# Patient Record
Sex: FEMALE | Race: WHITE
[De-identification: names, ages, dates, MRNs, and addresses within clinical notes are randomized per-mention and may not be internally consistent; named-entity substitution may affect disease eponyms.]

---

## 2017-02-19 NOTE — RADHPO
EXAM DATE/TIME:  02/19/2017 17:49 

 

[facility] COMPARISON:     

No previous studies available for comparison.

 

 

INDICATIONS :     

Epigastric abdomen pain today.

                  

 

ORAL CONTRAST:      

No oral contrast ingested.

                  

 

RADIATION DOSE:     

10.49 CTDIvol (mGy) 

 

 

MEDICAL HISTORY :     

None  

 

SURGICAL HISTORY :      

Gastric bypass. Hysterectomy.gastric bypass reversal

 

ENCOUNTER:      

Initial

 

ACUITY:      

1 day

 

PAIN SCALE:      

7/10

 

LOCATION:         

epigastric abdomen

 

TECHNIQUE:     

Volumetric scanning of the abdomen and pelvis was performed.  Using automated exposure control and ad
justment of the mA and/or kV according to patient size, radiation dose was kept as low as reasonably 
achievable to obtain optimal diagnostic quality images. 

 

FINDINGS:     

There is linear scarring left lung base.

 

No acute findings in the liver, spleen, adrenals, kidneys or pancreas. No calcified gallstones. There
 are postoperative changes of gastric bypass surgery, ventral hernia repair and hysterectomy.

 

There is no bowel obstruction. No free air or free fluid. Mild constipation.

 

CONCLUSION:     

1. Mild constipation. No acute findings. Postoperative changes of gastric bypass surgery, ventral her
yordan repair and hysterectomy.

 

 

 

 Jose Valle MD on February 19, 2017 at 18:44           

Board Certified Radiologist.

 This report was verified electronically.

## 2017-02-20 NOTE — EKG
Date Performed: 02/19/2017       Time Performed: 17:37:28

 

PTAGE:      66 years

 

EKG:      Sinus rhythm 

 

 Normal ECG 

 

NO PREVIOUS TRACING            

 

DOCTOR:   Hamilton Metzger  Interpretating Date/Time  02/20/2017 14:27:32

## 2017-07-06 NOTE — PD
HPI


Chief Complaint:  Abdominal Pain


Time Seen by Provider:  16:56


Travel History


International Travel<30 days:  No


Contact w/Intl Traveler<30days:  No


Traveled to known affect area:  No





History of Present Illness


HPI


The patient is a 66-year-old  female who presents to the emergency 

department for epigastric abdominal pain.  The patient has a history of 

previous gastric bypass that was performed in  when she weighed 254 pounds.

  The patient had a reversal done several years later when she weighed less 

than 100 pounds.  The patient has had a history of chronic epigastric abdominal 

pain since the reversal.  The patient traveled from [place] yesterday till [place]

, and helps of relocating after her   2 years ago.  The patient has 

been under a lot of stress recently secondary to her living circumstances.  The 

patient complains of epigastric abdominal pain that radiates diffusely when her 

abdomen is palpated.  She does complain of nausea without any vomiting.  The 

patient had a large bowel movement this normal without any visible blood.  The 

patient does have a history of previous hysterectomy, but denies a history 

pancreatitis or cholecystitis.  The patient does drink alcohol occasionally, 

approximately once per month.  Patient denies any fever, chills, or sweats.  

The patient does have a history of atrial fibrillation for which she takes an 

antiarrhythmic medication.





PFSH


Past Medical History


Heart Rhythm Problems:  Yes (A FIB)


Cardiovascular Problems:  Yes (hx of a-fib)


Diminished Hearing:  Yes


Headaches:  Yes (MIGRAINES)


Ulcer:  Yes


Tetanus Vaccination:  Unknown


Pregnant?:  Not Pregnant





Past Surgical History


Hysterectomy:  Yes


Other Surgery:  Yes (GASTRIC BYPASS AND REVERSAL)





Social History


Alcohol Use:  Yes


Tobacco Use:  Yes (10 CIGS PER DAY)


Substance Use:  No





Allergies-Medications


(Allergen,Severity, Reaction):  


Coded Allergies:  


     Codeine (Verified  Allergy, Intermediate, headache,n/v, 17)


     Morphine (Verified  Allergy, Intermediate, headache,n/v, 17)


     Oxycontin (Verified  Allergy, Intermediate, headaches,n/v, 17)


Reported Meds & Prescriptions





Reported Meds & Active Scripts


Active


Reported


Omeprazole 40 Mg Cap 80 Mg PO DAILY


Clonazepam 1 Mg Tab 1 Mg PO HS


Neurontin (Gabapentin) 300 Mg Cap 900 Mg PO TID


Paroxetine (Paroxetine HCl) 10 Mg Tab 10 Mg PO DAILY


Ditropan (Oxybutynin Chloride) 5 Mg Tab 5 Mg PO DAILY


Levothyroxine (Levothyroxine Sodium) 75 Mcg Tab 75 Mcg PO DAILY


Duloxetine DR (Duloxetine HCl) 60 Mg Capdr 60 Mg PO DAILY


Propafenone ER (Propafenone HCl) 225 Mg Cap 225 Mg PO Q12HR


Topiramate 50 Mg Tab 75 Mg PO HS


Paroxetine (Paroxetine HCl) 40 Mg Tab 40 Mg PO DAILY








Review of Systems


Except as stated in HPI:  all other systems reviewed are Neg


General / Constitutional:  No: Fever, Chills


Cardiovascular:  Positive: Irregular Rhythm (history of atrial fibrillation),  

No: Chest Pain or Discomfort


Respiratory:  No: Shortness of Breath


Gastrointestinal:  Positive: Nausea, Abdominal Pain,  No: Vomiting, Diarrhea, 

Constipation


Genitourinary:  No: Dysuria


Musculoskeletal:  No: Weakness


Neurologic:  No: Dizziness





Physical Exam


Narrative


GENERAL: Awake, alert, pleasant 66-year-old female who appears her stated age 

and is in no acute respiratory distress.


SKIN: Warm and dry.


HEAD: Atraumatic. Normocephalic. 


EYES: No injection or drainage.


ENT: No nasal bleeding or discharge.  Slightly dry mucous membranes.


NECK: Trachea midline. No JVD. 


CARDIOVASCULAR: Regular rate and rhythm.  No murmur appreciated.


RESPIRATORY: No accessory muscle use. Clear to auscultation. Breath sounds 

equal bilaterally. 


GASTROINTESTINAL: Abdomen soft, tender to palpation epigastrium and upper 

quadrant, but no rebound tenderness, guarding, rigidity.


MUSCULOSKELETAL: No obvious deformities. No clubbing.  No cyanosis.  No edema. 


NEUROLOGICAL: Awake and alert. No obvious cranial nerve deficits.  Motor 

grossly within normal limits. Normal speech.


PSYCHIATRIC: Appropriate mood and affect; insight and judgment normal.





Data


Data


Last Documented VS





Vital Signs








  Date Time  Temp Pulse Resp B/P Pulse Ox O2 Delivery O2 Flow Rate FiO2


 


17 18:56      Room Air  


 


17 17:16  82 18 166/87 97   


 


17 15:30 98.3       








Orders





 Urinalysis - C+S If Indicated (17 15:23)


Complete Blood Count With Diff (17 17:24)


Comprehensive Metabolic Panel (17 17:24)


Lipase (17 17:24)


Lactic Acid (17 17:24)


Prothrombin Time / Inr (Pt) (17 17:24)


Act Partial Throm Time (Ptt) (17 17:24)


Ct Abd/Pel W/O Iv Contrast (17 17:24)


Iv Access Insert/Monitor (17 17:24)


Ecg Monitoring (17 17:24)


Oximetry (17 17:24)


Ondansetron Inj (Zofran Inj) (17 17:30)


Sodium Chlor 0.9% 1000 Ml Inj (Ns 1000 M (17 17:24)


Sodium Chloride 0.9% Flush (Ns Flush) (17 17:30)


Electrocardiogram (17 17:24)


Hydromorphone Pf Inj (Dilaudid Pf Inj) (17 17:30)


Sodium Chlorid 0.9% 500 Ml Inj (Ns 500 M (17 19:15)





Labs








 Laboratory Tests








Test 17





 17:30 18:25


 


White Blood Count 14.0 TH/MM3 


 


Red Blood Count 4.87 MIL/MM3 


 


Hemoglobin 14.8 GM/DL 


 


Hematocrit 46.3 % 


 


Mean Corpuscular Volume 95.1 FL 


 


Mean Corpuscular Hemoglobin 30.4 PG 


 


Mean Corpuscular Hemoglobin 32.0 % 





Concent  


 


Red Cell Distribution Width 15.1 % 


 


Platelet Count 435 TH/MM3 


 


Mean Platelet Volume 8.6 FL 


 


Neutrophils (%) (Auto) 88.1 % 


 


Lymphocytes (%) (Auto) 7.5 % 


 


Monocytes (%) (Auto) 3.2 % 


 


Eosinophils (%) (Auto) 0.1 % 


 


Basophils (%) (Auto) 1.1 % 


 


Neutrophils # (Auto) 12.4 TH/MM3 


 


Lymphocytes # (Auto) 1.0 TH/MM3 


 


Monocytes # (Auto) 0.4 TH/MM3 


 


Eosinophils # (Auto) 0.0 TH/MM3 


 


Basophils # (Auto) 0.2 TH/MM3 


 


CBC Comment DIFF FINAL  


 


Differential Comment   


 


Prothrombin Time 10.3 SEC 


 


Prothromb Time International 0.9 RATIO 





Ratio  


 


Activated Partial 20.5 SEC 





Thromboplast Time  


 


Sodium Level 140 MEQ/L 


 


Potassium Level 3.5 MEQ/L 


 


Chloride Level 106 MEQ/L 


 


Carbon Dioxide Level 19.3 MEQ/L 


 


Anion Gap 15 MEQ/L 


 


Blood Urea Nitrogen 15 MG/DL 


 


Creatinine 0.64 MG/DL 


 


Estimat Glomerular Filtration 92 ML/MIN 





Rate  


 


Random Glucose 162 MG/DL 


 


Lactic Acid Level 1.5 mmol/L 


 


Calcium Level 9.3 MG/DL 


 


Total Bilirubin 0.4 MG/DL 


 


Aspartate Amino Transf 10 U/L 





(AST/SGOT)  


 


Alanine Aminotransferase 13 U/L 





(ALT/SGPT)  


 


Alkaline Phosphatase 98 U/L 


 


Total Protein 8.1 GM/DL 


 


Albumin 4.1 GM/DL 


 


Lipase 66 U/L 


 


Urine Color  YELLOW 


 


Urine Turbidity  SLIGHT 


 


Urine pH  7.0 


 


Urine Specific Gravity  1.016 


 


Urine Protein  30 mg/dL


 


Urine Glucose (UA)  NEG mg/dL


 


Urine Ketones  40 mg/dL


 


Urine Occult Blood  TRACE 


 


Urine Nitrite  NEG 


 


Urine Bilirubin  NEG 


 


Urine Leukocyte Esterase  NEG 


 


Urine RBC  0-3 /hpf


 


Urine WBC  0-2 /hpf


 


Urine Squamous Epithelial  0-5 /hpf





Cells  


 


Urine Amorphous Sediment  MOD 


 


Urine Hyaline Casts  3-5 /lpf


 


Urine Mucus  FEW /lpf


 


Microscopic Urinalysis Comment  CULT NOT





  INDICATED














MDM


Medical Decision Making


Medical Screen Exam Complete:  Yes


Emergency Medical Condition:  Yes


Medical Record Reviewed:  Yes


Interpretation(s)


EKG reveals normal sinus rhythm with a rate of 77.  No ischemic changes noted.








Last Impressions








Abdomen/Pelvis CT 17 1724 Signed





Impressions: 





 Service Date/Time:  2017 17:49 - CONCLUSION:  1. Mild 





 constipation. No acute findings. Postoperative changes of gastric bypass 





 surgery, ventral hernia repair and hysterectomy.     Jose Valle MD 








 Laboratory Tests








Test 17





 17:30 18:25


 


White Blood Count 14.0 TH/MM3 


 


Red Blood Count 4.87 MIL/MM3 


 


Hemoglobin 14.8 GM/DL 


 


Hematocrit 46.3 % 


 


Mean Corpuscular Volume 95.1 FL 


 


Mean Corpuscular Hemoglobin 30.4 PG 


 


Mean Corpuscular Hemoglobin 32.0 % 





Concent  


 


Red Cell Distribution Width 15.1 % 


 


Platelet Count 435 TH/MM3 


 


Mean Platelet Volume 8.6 FL 


 


Neutrophils (%) (Auto) 88.1 % 


 


Lymphocytes (%) (Auto) 7.5 % 


 


Monocytes (%) (Auto) 3.2 % 


 


Eosinophils (%) (Auto) 0.1 % 


 


Basophils (%) (Auto) 1.1 % 


 


Neutrophils # (Auto) 12.4 TH/MM3 


 


Lymphocytes # (Auto) 1.0 TH/MM3 


 


Monocytes # (Auto) 0.4 TH/MM3 


 


Eosinophils # (Auto) 0.0 TH/MM3 


 


Basophils # (Auto) 0.2 TH/MM3 


 


CBC Comment DIFF FINAL  


 


Differential Comment   


 


Prothrombin Time 10.3 SEC 


 


Prothromb Time International 0.9 RATIO 





Ratio  


 


Activated Partial 20.5 SEC 





Thromboplast Time  


 


Sodium Level 140 MEQ/L 


 


Potassium Level 3.5 MEQ/L 


 


Chloride Level 106 MEQ/L 


 


Carbon Dioxide Level 19.3 MEQ/L 


 


Anion Gap 15 MEQ/L 


 


Blood Urea Nitrogen 15 MG/DL 


 


Creatinine 0.64 MG/DL 


 


Estimat Glomerular Filtration 92 ML/MIN 





Rate  


 


Random Glucose 162 MG/DL 


 


Lactic Acid Level 1.5 mmol/L 


 


Calcium Level 9.3 MG/DL 


 


Total Bilirubin 0.4 MG/DL 


 


Aspartate Amino Transf 10 U/L 





(AST/SGOT)  


 


Alanine Aminotransferase 13 U/L 





(ALT/SGPT)  


 


Alkaline Phosphatase 98 U/L 


 


Total Protein 8.1 GM/DL 


 


Albumin 4.1 GM/DL 


 


Lipase 66 U/L 


 


Urine Color  YELLOW 


 


Urine Turbidity  SLIGHT 


 


Urine pH  7.0 


 


Urine Specific Gravity  1.016 


 


Urine Protein  30 mg/dL


 


Urine Glucose (UA)  NEG mg/dL


 


Urine Ketones  40 mg/dL


 


Urine Occult Blood  TRACE 


 


Urine Nitrite  NEG 


 


Urine Bilirubin  NEG 


 


Urine Leukocyte Esterase  NEG 


 


Urine RBC  0-3 /hpf


 


Urine WBC  0-2 /hpf


 


Urine Squamous Epithelial  0-5 /hpf





Cells  


 


Urine Amorphous Sediment  MOD 


 


Urine Hyaline Casts  3-5 /lpf


 


Urine Mucus  FEW /lpf


 


Microscopic Urinalysis Comment  CULT NOT





  INDICATED








Differential Diagnosis


Differential diagnosis includes gastritis, peptic ulcer disease, pancreatitis, 

biliary colic, atypical cholecystitis, AAA, inferior myocardial infarction, 

partial small bowel obstruction.


Narrative Course


IV was established, labs are drawn and sent, and the patient was placed on 

cardiac telemetry monitoring and continuous pulse oximetry monitoring.  EKG was 

ordered and interpreted.  The patient was administered Dilaudid, Zofran, and IV 

fluids.  CT of the abdomen and pelvis was ordered.  Patient's white count is 

mildly elevated at 14.0.  BUN/creatinine are normal, however, bicarbonate was 

slightly low and UA reveals ketones, therefore, patient was administered more 

IV fluids.  CT of the abdomen and pelvis reveals postoperative changes but no 

acute findings except for mild constipation.  The patient will be discharged 

home on pain medications and antiemetics.  The patient had a large bowel 

movement earlier today per her report.  She is advised to follow-up with a 

primary physician and return if symptoms worsen or progress.





Diagnosis





 Primary Impression:  


 Abdominal pain


 Qualified Code:  R10.13 - Epigastric pain





***Additional Instructions:


Medications as directed.  Follow-up with a primary physician.  Return if 

symptoms worsen or progress.  Please provide the patient a copy of her CT 

results and lab results at discharge.


***Med/Other Pt SpecificInfo:  Prescription(s) given


Scripts


Ondansetron Odt (Zofran Odt)4 Mg Tab4 Mg SL Q6HR PRN (Nausea/Vomiting) #7 TAB  

Ref 0


   Prov:Tobin Ovalle MD         17 


Hydrocodone-Acetaminophen (Norco)5-325 mg Tab1 Tab PO Q6H PRN (PAIN) #12 TAB  

Ref 0


   Prov:Tobin Ovalle MD         17


Disposition:  01 DISCHARGE HOME


Condition:  Stable








Tobin Ovalle MD 2017 17:36 : Yes

## 2017-08-26 ENCOUNTER — HOSPITAL ENCOUNTER (EMERGENCY)
Dept: HOSPITAL 17 - PHED | Age: 67
Discharge: HOME | End: 2017-08-26
Payer: MEDICARE

## 2017-08-26 VITALS — HEIGHT: 62 IN | WEIGHT: 114.64 LBS | BODY MASS INDEX: 21.1 KG/M2

## 2017-08-26 VITALS
OXYGEN SATURATION: 96 % | RESPIRATION RATE: 18 BRPM | DIASTOLIC BLOOD PRESSURE: 89 MMHG | HEART RATE: 70 BPM | TEMPERATURE: 97.7 F | SYSTOLIC BLOOD PRESSURE: 184 MMHG

## 2017-08-26 DIAGNOSIS — Z98.84: ICD-10-CM

## 2017-08-26 DIAGNOSIS — M79.7: ICD-10-CM

## 2017-08-26 DIAGNOSIS — I48.91: ICD-10-CM

## 2017-08-26 DIAGNOSIS — F17.210: ICD-10-CM

## 2017-08-26 DIAGNOSIS — R94.31: ICD-10-CM

## 2017-08-26 DIAGNOSIS — K27.9: Primary | ICD-10-CM

## 2017-08-26 LAB
ALP SERPL-CCNC: 87 U/L (ref 45–117)
ALT SERPL-CCNC: 16 U/L (ref 10–53)
ANION GAP SERPL CALC-SCNC: 12 MEQ/L (ref 5–15)
AST SERPL-CCNC: 14 U/L (ref 15–37)
BASOPHILS # BLD AUTO: 0.1 TH/MM3 (ref 0–0.2)
BASOPHILS NFR BLD: 1.2 % (ref 0–2)
BILIRUB SERPL-MCNC: 0.4 MG/DL (ref 0.2–1)
BUN SERPL-MCNC: 13 MG/DL (ref 7–18)
CHLORIDE SERPL-SCNC: 99 MEQ/L (ref 98–107)
EOSINOPHIL # BLD: 0 TH/MM3 (ref 0–0.4)
EOSINOPHIL NFR BLD: 0.3 % (ref 0–4)
ERYTHROCYTE [DISTWIDTH] IN BLOOD BY AUTOMATED COUNT: 15.6 % (ref 11.6–17.2)
GFR SERPLBLD BASED ON 1.73 SQ M-ARVRAT: 107 ML/MIN (ref 89–?)
HCO3 BLD-SCNC: 23.6 MEQ/L (ref 21–32)
HCT VFR BLD CALC: 51.6 % (ref 35–46)
HEMO FLAGS: (no result)
LYMPHOCYTES # BLD AUTO: 1 TH/MM3 (ref 1–4.8)
LYMPHOCYTES NFR BLD AUTO: 9.1 % (ref 9–44)
MCH RBC QN AUTO: 30.9 PG (ref 27–34)
MCHC RBC AUTO-ENTMCNC: 31.4 % (ref 32–36)
MCV RBC AUTO: 98.2 FL (ref 80–100)
MONOCYTES NFR BLD: 2.5 % (ref 0–8)
NEUTROPHILS # BLD AUTO: 9.1 TH/MM3 (ref 1.8–7.7)
NEUTROPHILS NFR BLD AUTO: 86.9 % (ref 16–70)
PLATELET # BLD: 486 TH/MM3 (ref 150–450)
POTASSIUM SERPL-SCNC: 4.3 MEQ/L (ref 3.5–5.1)
RBC # BLD AUTO: 5.25 MIL/MM3 (ref 4–5.3)
SODIUM SERPL-SCNC: 135 MEQ/L (ref 136–145)
WBC # BLD AUTO: 10.5 TH/MM3 (ref 4–11)

## 2017-08-26 PROCEDURE — 85025 COMPLETE CBC W/AUTO DIFF WBC: CPT

## 2017-08-26 PROCEDURE — 80053 COMPREHEN METABOLIC PANEL: CPT

## 2017-08-26 PROCEDURE — 93005 ELECTROCARDIOGRAM TRACING: CPT

## 2017-08-26 PROCEDURE — 96361 HYDRATE IV INFUSION ADD-ON: CPT

## 2017-08-26 PROCEDURE — 96374 THER/PROPH/DIAG INJ IV PUSH: CPT

## 2017-08-26 PROCEDURE — 83690 ASSAY OF LIPASE: CPT

## 2017-08-26 PROCEDURE — 99284 EMERGENCY DEPT VISIT MOD MDM: CPT

## 2017-08-26 PROCEDURE — 96375 TX/PRO/DX INJ NEW DRUG ADDON: CPT

## 2017-08-26 NOTE — PD
Data


Data


Last Documented VS





Vital Signs








  Date Time  Temp Pulse Resp B/P (MAP) Pulse Ox O2 Delivery O2 Flow Rate FiO2


 


8/26/17 15:28 97.7 70 18 184/89 (120) 96   








Orders





 Orders


Complete Blood Count With Diff (8/26/17 15:48)


Comprehensive Metabolic Panel (8/26/17 15:48)


Lipase (8/26/17 15:48)


Sodium Chlor 0.9% 1000 Ml Inj (Ns 1000 M (8/26/17 16:00)


Ondansetron Inj (Zofran Inj) (8/26/17 16:00)


Hydromorphone Pf Inj (Dilaudid Pf Inj) (8/26/17 16:00)


Al-Mag Hy-Si 40-40-4 Mg/Ml Liq (Mag-Al P (8/26/17 16:00)


Lidocaine 2% Viscous (Xylocaine 2% Visco (8/26/17 15:57)


Electrocardiogram (8/26/17 )


Sucralfate (Carafate) (8/26/17 16:45)





Labs





Laboratory Tests








Test


  8/26/17


16:00


 


White Blood Count 10.5 TH/MM3 


 


Red Blood Count 5.25 MIL/MM3 


 


Hemoglobin 16.2 GM/DL 


 


Hematocrit 51.6 % 


 


Mean Corpuscular Volume 98.2 FL 


 


Mean Corpuscular Hemoglobin 30.9 PG 


 


Mean Corpuscular Hemoglobin


Concent 31.4 % 


 


 


Red Cell Distribution Width 15.6 % 


 


Platelet Count 486 TH/MM3 


 


Mean Platelet Volume 8.1 FL 


 


Neutrophils (%) (Auto) 86.9 % 


 


Lymphocytes (%) (Auto) 9.1 % 


 


Monocytes (%) (Auto) 2.5 % 


 


Eosinophils (%) (Auto) 0.3 % 


 


Basophils (%) (Auto) 1.2 % 


 


Neutrophils # (Auto) 9.1 TH/MM3 


 


Lymphocytes # (Auto) 1.0 TH/MM3 


 


Monocytes # (Auto) 0.3 TH/MM3 


 


Eosinophils # (Auto) 0.0 TH/MM3 


 


Basophils # (Auto) 0.1 TH/MM3 


 


CBC Comment DIFF FINAL 


 


Differential Comment  


 


Blood Urea Nitrogen 13 MG/DL 


 


Creatinine 0.56 MG/DL 


 


Random Glucose 158 MG/DL 


 


Total Protein 8.0 GM/DL 


 


Albumin 3.8 GM/DL 


 


Calcium Level 9.8 MG/DL 


 


Alkaline Phosphatase 87 U/L 


 


Aspartate Amino Transf


(AST/SGOT) 14 U/L 


 


 


Alanine Aminotransferase


(ALT/SGPT) 16 U/L 


 


 


Total Bilirubin 0.4 MG/DL 


 


Sodium Level 135 MEQ/L 


 


Potassium Level 4.3 MEQ/L 


 


Chloride Level 99 MEQ/L 


 


Carbon Dioxide Level 23.6 MEQ/L 


 


Anion Gap 12 MEQ/L 


 


Estimat Glomerular Filtration


Rate 107 ML/MIN 


 


 


Lipase 87 U/L 











[facility]


Supervised Visit with VINEET:  No


Narrative Course


The patient was initially evaluated by the previous provider and sent out to me 

at the beginning of my shift at approximately 4:00 PM pending labs and 

disposition.  See his note for further details.





Briefly this is a 67-year-old female with history of peptic ulcer disease who 

recently had an upper endoscopy about a month ago showing peptic ulcers who is 

on Protonix and Carafate, however ran out of her Carafate 3 weeks ago, here for 

evaluation of epigastric abdominal pain since yesterday.  Pain feels very 

similar to exacerbation of peptic ulcer disease in the past.  She was provided 

Dilaudid, Protonix, GI cocktail, and Carafate here in the emergency department.

  On reassessment she is feeling significantly improved.  Her abdominal exam 

shows no tenderness.  EKG shows sinus, rate 68, normal axis, short SC interval, 

prolonged QT interval, no acute ischemic abnormalities.  CBC is remarkable for 

hemoglobin 16.6, hematocrit 51.6.  The patient smokes.  CMP is unremarkable.  

Lipase is 87.  Patient feels well enough to be discharged home.  She was 

informed on when to return to the emergency department.  PMD follow-up this 

week.  She verbalizes understanding and agreement with plan.


Diagnosis





 Primary Impression:  


 Peptic ulcer disease


Referrals:  


Primary Care Physician


1 week





***Additional Instruction:  


Follow-up with a primary care physician this week.


Return to the emergency department for worsening symptoms or any other concerns.


Scripts


Sucralfate (Carafate) 1 Gm Tab


1 GM PO QID for Ulcer Prevention, #120 TAB 0 Refills


   On empty stomach


   Prov: Jeffrey Villegas MD         8/26/17


Disposition:  01 DISCHARGE HOME


Condition:  Stable











Darwin Rivas MD Aug 26, 2017 17:06

## 2017-08-26 NOTE — PD
HPI


Chief Complaint:  Abdominal Pain


Time Seen by Provider:  15:36


Travel History


International Travel<30 days:  No


Contact w/Intl Traveler<30days:  No


Traveled to known affect area:  No





History of Present Illness


HPI


This 67-year-old female is complaining of epigastric pain.  She has a history 

of peptic ulcer.  She's been having increasing pain since about 3:30 last 

night.  She has been under a lot of stress recently.  She recently moved here 

from [place].  She is on protonic's and has been on Carafate.  She ran out of her 

Carafate.  Weeks ago.  She did have 2 alcoholic drinks yesterday.  She has no 

history of pancreatitis.  She does have a history of gastric bypass in 2011.  

She developed a lot of complications from the bypass and had a reversed 2013, 

she had a similar episode to this in February and was seen here at that time





[facility]


Past Medical History


Anxiety:  Yes


Heart Rhythm Problems:  Yes (A FIB)


Cardiovascular Problems:  Yes (hx of a-fib)


Diminished Hearing:  Yes


Fibromyalgia:  Yes


Headaches:  Yes (MIGRAINES)


Ulcer:  Yes


Influenza Vaccination:  Yes


Pregnant?:  Not Pregnant





Past Surgical History


Abdominal Surgery:  Yes (bypass)


Hysterectomy:  Yes


Joint Replacement:  Yes (right knee)


Other Surgery:  Yes (GASTRIC BYPASS AND REVERSAL)





Social History


Alcohol Use:  Yes


Tobacco Use:  Yes (10 CIGS PER DAY)


Substance Use:  No





Allergies-Medications


(Allergen,Severity, Reaction):  


Coded Allergies:  


     codeine (Unverified  Allergy, Intermediate, headache,n/v, 8/26/17)


     morphine (Unverified  Allergy, Intermediate, headache,n/v, 8/26/17)


     oxycodone (Unverified  Allergy, Intermediate, headaches,n/v, 8/26/17)


Reported Meds & Prescriptions





Reported Meds & Active Scripts


Active


Reported


Omeprazole 40 Mg Cap 80 Mg PO DAILY


Clonazepam 1 Mg Tab 1 Mg PO HS


Neurontin (Gabapentin) 300 Mg Cap 900 Mg PO TID


Paroxetine (Paroxetine HCl) 10 Mg Tab 10 Mg PO DAILY


Ditropan (Oxybutynin Chloride) 5 Mg Tab 5 Mg PO DAILY


Levothyroxine (Levothyroxine Sodium) 75 Mcg Tab 75 Mcg PO DAILY


Duloxetine DR (Duloxetine HCl) 60 Mg Capdr 60 Mg PO DAILY


Topiramate 50 Mg Tab 75 Mg PO HS


Paroxetine (Paroxetine HCl) 40 Mg Tab 40 Mg PO DAILY








Review of Systems


General / Constitutional:  No: Fever, Chills


Eyes:  No: Diploplia, Blurred Vision


HENT:  No: Headaches, Vertigo


Cardiovascular:  No: Chest Pain or Discomfort


Respiratory:  No: Cough, Shortness of Breath


Gastrointestinal:  Positive: Abdominal Pain


Genitourinary:  No: Frequency, Dysuria


Musculoskeletal:  No: Myalgias


Skin:  No Rash


Hematologic/Lymphatic:  No: Easy Bruising





Physical Exam


Narrative


GENERAL: Female


SKIN: Focused skin assessment warm/dry.


HEAD: Atraumatic. Normocephalic. 


EYES: Pupils equal and round. No scleral icterus. No injection or drainage. 


ENT: No nasal bleeding or discharge.  Mucous membranes pink and moist.


NECK: Trachea midline. No JVD. 


CARDIOVASCULAR: Regular rate and rhythm.  No murmur appreciated.


RESPIRATORY: No accessory muscle use. Clear to auscultation. Breath sounds 

equal bilaterally. 


GASTROINTESTINAL: Abdomen soft, there is epigastric tenderness, nondistended. 

Hepatic and splenic margins not palpable. 


MUSCULOSKELETAL: No obvious deformities. No clubbing.  No cyanosis.  No edema. 


NEUROLOGICAL: Awake and alert. No obvious cranial nerve deficits.  Motor 

grossly within normal limits. Normal speech.


PSYCHIATRIC: Appropriate mood and affect; insight and judgment normal.





Data


Data


Last Documented VS





Vital Signs








  Date Time  Temp Pulse Resp B/P (MAP) Pulse Ox O2 Delivery O2 Flow Rate FiO2


 


8/26/17 15:28 97.7 70 18 184/89 (120) 96   








Orders





 Orders


Complete Blood Count With Diff (8/26/17 15:48)


Comprehensive Metabolic Panel (8/26/17 15:48)


Lipase (8/26/17 15:48)


Sodium Chlor 0.9% 1000 Ml Inj (Ns 1000 M (8/26/17 16:00)


Ondansetron Inj (Zofran Inj) (8/26/17 16:00)


Hydromorphone Pf Inj (Dilaudid Pf Inj) (8/26/17 16:00)








[facility]


Medical Decision Making


Medical Screen Exam Complete:  Yes


Emergency Medical Condition:  Yes


Medical Record Reviewed:  Yes


Differential Diagnosis


Differential includes gastritis, pancreatitis


Narrative Course


Lipase have been ordered.  Patient is given pain medication.





Diagnosis





 Primary Impression:  


 Peptic ulcer disease


Scripts


Sucralfate (Carafate) 1 Gm Tab


1 GM PO QID for Ulcer Prevention, #120 TAB 0 Refills


   On empty stomach


   Prov: MacMahon,Jeffrey MD         8/26/17


Disposition:  01 DISCHARGE HOME


Condition:  Stable











Jeffrey Villegas MD Aug 26, 2017 15:53

## 2017-08-26 NOTE — EKG
Date Performed: 08/26/2017       Time Performed: 16:28:27

 

PTAGE:      67 years

 

EKG:      Sinus rhythm 

 

 WITH SHORT WV INTERVAL PROLONGED QT INTERVAL ABNORMAL ECG

 

PREVIOUS TRACING       : 02/19/2017 17.37 Compared to prior tracing no significant change

 

DOCTOR:   Magali Tamez  Interpretating Date/Time  08/26/2017 22:18:01

## 2017-08-29 ENCOUNTER — HOSPITAL ENCOUNTER (INPATIENT)
Dept: HOSPITAL 17 - PHED | Age: 67
LOS: 4 days | Discharge: HOME | DRG: 392 | End: 2017-09-02
Attending: HOSPITALIST | Admitting: HOSPITALIST
Payer: MEDICARE

## 2017-08-29 VITALS — OXYGEN SATURATION: 97 %

## 2017-08-29 VITALS — OXYGEN SATURATION: 96 %

## 2017-08-29 VITALS — HEIGHT: 62 IN | BODY MASS INDEX: 22.11 KG/M2 | WEIGHT: 120.15 LBS

## 2017-08-29 VITALS
TEMPERATURE: 99.5 F | HEART RATE: 74 BPM | DIASTOLIC BLOOD PRESSURE: 74 MMHG | RESPIRATION RATE: 16 BRPM | OXYGEN SATURATION: 98 % | SYSTOLIC BLOOD PRESSURE: 144 MMHG

## 2017-08-29 VITALS
HEART RATE: 91 BPM | TEMPERATURE: 98.4 F | DIASTOLIC BLOOD PRESSURE: 98 MMHG | OXYGEN SATURATION: 98 % | RESPIRATION RATE: 16 BRPM | SYSTOLIC BLOOD PRESSURE: 185 MMHG

## 2017-08-29 VITALS
OXYGEN SATURATION: 98 % | HEART RATE: 76 BPM | DIASTOLIC BLOOD PRESSURE: 81 MMHG | RESPIRATION RATE: 16 BRPM | SYSTOLIC BLOOD PRESSURE: 144 MMHG

## 2017-08-29 VITALS
RESPIRATION RATE: 18 BRPM | TEMPERATURE: 98 F | SYSTOLIC BLOOD PRESSURE: 139 MMHG | HEART RATE: 73 BPM | DIASTOLIC BLOOD PRESSURE: 75 MMHG | OXYGEN SATURATION: 96 %

## 2017-08-29 VITALS
HEART RATE: 73 BPM | RESPIRATION RATE: 16 BRPM | OXYGEN SATURATION: 94 % | DIASTOLIC BLOOD PRESSURE: 82 MMHG | SYSTOLIC BLOOD PRESSURE: 146 MMHG | TEMPERATURE: 99.2 F

## 2017-08-29 VITALS — OXYGEN SATURATION: 94 %

## 2017-08-29 DIAGNOSIS — Z90.710: ICD-10-CM

## 2017-08-29 DIAGNOSIS — F17.210: ICD-10-CM

## 2017-08-29 DIAGNOSIS — Z96.651: ICD-10-CM

## 2017-08-29 DIAGNOSIS — H91.90: ICD-10-CM

## 2017-08-29 DIAGNOSIS — R11.2: ICD-10-CM

## 2017-08-29 DIAGNOSIS — E03.9: ICD-10-CM

## 2017-08-29 DIAGNOSIS — E87.6: ICD-10-CM

## 2017-08-29 DIAGNOSIS — K21.9: ICD-10-CM

## 2017-08-29 DIAGNOSIS — I48.91: ICD-10-CM

## 2017-08-29 DIAGNOSIS — K27.9: ICD-10-CM

## 2017-08-29 DIAGNOSIS — G43.909: ICD-10-CM

## 2017-08-29 DIAGNOSIS — R94.31: ICD-10-CM

## 2017-08-29 DIAGNOSIS — F41.8: ICD-10-CM

## 2017-08-29 DIAGNOSIS — Z98.84: ICD-10-CM

## 2017-08-29 DIAGNOSIS — E87.1: ICD-10-CM

## 2017-08-29 DIAGNOSIS — R10.13: Primary | ICD-10-CM

## 2017-08-29 DIAGNOSIS — M79.7: ICD-10-CM

## 2017-08-29 LAB
ALP SERPL-CCNC: 81 U/L (ref 45–117)
ALT SERPL-CCNC: 14 U/L (ref 10–53)
ANION GAP SERPL CALC-SCNC: 9 MEQ/L (ref 5–15)
AST SERPL-CCNC: 12 U/L (ref 15–37)
BASOPHILS # BLD AUTO: 0.6 TH/MM3 (ref 0–0.2)
BASOPHILS NFR BLD: 4.1 % (ref 0–2)
BILIRUB SERPL-MCNC: 0.3 MG/DL (ref 0.2–1)
BUN SERPL-MCNC: 13 MG/DL (ref 7–18)
CHLORIDE SERPL-SCNC: 93 MEQ/L (ref 98–107)
COLOR UR: YELLOW
COMMENT (UR): (no result)
CULTURE IF INDICATED: (no result)
EOSINOPHIL # BLD: 0 TH/MM3 (ref 0–0.4)
EOSINOPHIL NFR BLD: 0.1 % (ref 0–4)
ERYTHROCYTE [DISTWIDTH] IN BLOOD BY AUTOMATED COUNT: 14.9 % (ref 11.6–17.2)
GFR SERPLBLD BASED ON 1.73 SQ M-ARVRAT: 58 ML/MIN (ref 89–?)
GLUCOSE UR STRIP-MCNC: (no result) MG/DL
HCO3 BLD-SCNC: 29.3 MEQ/L (ref 21–32)
HCT VFR BLD CALC: 52.2 % (ref 35–46)
HEMO FLAGS: (no result)
HGB UR QL STRIP: (no result)
KETONES UR STRIP-MCNC: (no result) MG/DL
LEUKOCYTE ESTERASE UR QL STRIP: (no result) /HPF (ref 0–5)
LYMPHOCYTES # BLD AUTO: 1.9 TH/MM3 (ref 1–4.8)
LYMPHOCYTES NFR BLD AUTO: 12.1 % (ref 9–44)
MCH RBC QN AUTO: 31.4 PG (ref 27–34)
MCHC RBC AUTO-ENTMCNC: 32.6 % (ref 32–36)
MCV RBC AUTO: 96.2 FL (ref 80–100)
METHOD OF COLLECTION: (no result)
MONOCYTES NFR BLD: 7.2 % (ref 0–8)
NEUTROPHILS # BLD AUTO: 11.8 TH/MM3 (ref 1.8–7.7)
NEUTROPHILS NFR BLD AUTO: 76.5 % (ref 16–70)
NITRITE UR QL STRIP: (no result)
PLATELET # BLD: 591 TH/MM3 (ref 150–450)
POTASSIUM SERPL-SCNC: 3.4 MEQ/L (ref 3.5–5.1)
RBC # BLD AUTO: 5.43 MIL/MM3 (ref 4–5.3)
RBC #/AREA URNS HPF: (no result) /HPF (ref 0–3)
RENAL EPI CELLS #/AREA URNS HPF: (no result) /HPF
SODIUM SERPL-SCNC: 131 MEQ/L (ref 136–145)
SP GR UR STRIP: 1.01 (ref 1–1.03)
SQUAMOUS #/AREA URNS HPF: (no result) /HPF (ref 0–5)
WBC # BLD AUTO: 15.4 TH/MM3 (ref 4–11)

## 2017-08-29 PROCEDURE — 83605 ASSAY OF LACTIC ACID: CPT

## 2017-08-29 PROCEDURE — 74177 CT ABD & PELVIS W/CONTRAST: CPT

## 2017-08-29 PROCEDURE — 96361 HYDRATE IV INFUSION ADD-ON: CPT

## 2017-08-29 PROCEDURE — 83036 HEMOGLOBIN GLYCOSYLATED A1C: CPT

## 2017-08-29 PROCEDURE — 87040 BLOOD CULTURE FOR BACTERIA: CPT

## 2017-08-29 PROCEDURE — 83735 ASSAY OF MAGNESIUM: CPT

## 2017-08-29 PROCEDURE — 74176 CT ABD & PELVIS W/O CONTRAST: CPT

## 2017-08-29 PROCEDURE — 80053 COMPREHEN METABOLIC PANEL: CPT

## 2017-08-29 PROCEDURE — 83690 ASSAY OF LIPASE: CPT

## 2017-08-29 PROCEDURE — 74270 X-RAY XM COLON 1CNTRST STD: CPT

## 2017-08-29 PROCEDURE — 93005 ELECTROCARDIOGRAM TRACING: CPT

## 2017-08-29 PROCEDURE — 96374 THER/PROPH/DIAG INJ IV PUSH: CPT

## 2017-08-29 PROCEDURE — 80048 BASIC METABOLIC PNL TOTAL CA: CPT

## 2017-08-29 PROCEDURE — 85025 COMPLETE CBC W/AUTO DIFF WBC: CPT

## 2017-08-29 PROCEDURE — 96375 TX/PRO/DX INJ NEW DRUG ADDON: CPT

## 2017-08-29 PROCEDURE — 74020: CPT

## 2017-08-29 PROCEDURE — C9113 INJ PANTOPRAZOLE SODIUM, VIA: HCPCS

## 2017-08-29 PROCEDURE — 81001 URINALYSIS AUTO W/SCOPE: CPT

## 2017-08-29 NOTE — MB
cc:

RENA SCHOFIELD MD

****

 

 

DATE OF CONSULTATION

17

 

 1950

 

REASON FOR REFERRAL

Abdominal pain

 

HISTORY OF PRESENT ILLNESS

Thank you for the consultation. A 67-year-old lady who has multiple medical

problems, mostly after gastric bypass in .  She ended  up having multiple

surgeries including revision of the bypass  because of severe weight loss.

Also, she has partial resection of her small bowel, PEG tube placement,

adhesion surgery.  The patient was doing okay.  She just moved recently from

[place] which put her under a lot  of stress about two weeks ago and then in the

last 24 hours she started having epigastric pain and she stated with nausea,

vomiting.  She said that the pain is dull, aggressive and she had some loose

bowel movements.  She also reports to me that she had an upper endoscopy and a

colonoscopy that  were unremarkable less than 2-3 months ago.  The patient's CT

scan was done in the ER which showed questionable volvulus, but then repeat CT

scan showed no evidence of volvulus with some dilation of the small bowel.

Currently, the patient is laying in bed comfortably.  She received pain

medicine in the emergency room, but she is not having any nausea and vomiting

and seems to be comfortable.

 

REVIEW OF SYSTEMS

All 12-point negative except HPI.

 

PAST SURGICAL HISTORY

1.  Hysterectomy,

2.  Multiple abdominal surgeries including gastric bypass and bypass reversal

 

3.  Right knee surgery.

 

PAST MEDICAL HISTORY

1.  Fibromyalgia,

2.  Anxiety,

3.  Hypothyroidism

4.  Headache and migraine.

 

MEDICATIONS

Reviewed in the chart.

 

ALLERGIES

CODEINE

MORPHINE

OXYCODONE

 

SOCIAL HISTORY

She smokes about a pack a day, drinks occasionally.

 

FAMILY HISTORY

Significant for pancreatic cancer and heart disease.

 

PHYSICAL EXAMINATION

GENERAL:  Currently alert, oriented in no acute distress.  She is not in pain,

but she just received pain medication in the ER a few hours ago.  No nausea or

vomiting and seems to be well-nourished and well-developed.

SKIN:  No jaundice.  No lesion or abnormalities.

HEENT:  Pupils round, reactive to light.

NECK:  Supple.

CHEST:  Clear to auscultation and percussion.

CARDIAC:   Regular rate and rhythm.  No murmur or gallop.   ABDOMEN:  Soft,

minimal tenderness in the epigastric area at this time.  Positive bowel sounds.

No hepatosplenomegaly.  Multiple surgical scars.

EXTREMITIES:  No edema, clubbing or cyanosis.

NEUROLOGIC: Neurologically intact.  Alert, oriented, nonfocal.  PSYCHIATRIC:

Appropriate with some anxiety from the move

 

LABORATORY DATA

White count 15.4, hemoglobin 17, platelet 591.

 

Sodium 131, potassium 3.4, BUN nine, creatinine 13, AST 12, ALT 14 and lipase

110.

 

Urine showed some protein in the urine and no white count.

 

ASSESSMENT/PLAN

A 67-year-old lady with abdominal pain.  I think  this is most likely

gastroenteritis because it suddenly happened.  She ate a hamburger from

[employer] yesterday and this happened after that. She had elevated white

count. Her symptoms are resolving.  The diarrhea also resolved, so CT scan did

not show any volvulus so we will plan on monitoring her. She already had an

endoscopy and a colonoscopy recently, so unless she develops more vomiting or

more symptoms, we will just  start her on clear liquids and treat her

symptomatically and we will see how she does.

 

 

 

                              _________________________________

                              MD TAVO Asher/

D:  2017/6:31 PM

T:  2017/7:31 PM

Visit #:  [id number]

Job #:  [id number]

## 2017-08-29 NOTE — HHI.HP
__________________________________________________





[facility]


Service


[facility]


Primary Care Physician


No Primary Care Physician


Admission Diagnosis





intractable abdominal pain


Diagnoses:  


Chief Complaint:  


Intractable nausea vomiting, abdominal pain.


Travel History


International Travel<30 Days:  No


Contact w/Intl Traveler <30 Da:  No


Traveled to Known Affected Are:  No





Sepsis Criteria


SIRS Criteria (2 or more):  Heart rate over 90, WBC > 09222, < 4000 or > 10% 

bands


Criteria Outcome:  Meets SIRS criteria


History of Present Illness


Ms. Painting is a 67-year-old  female with a history of gastric bypass 

and reversal who presents to the emergency department today due to progressive 

epigastric abdominal pain, nausea and vomiting that started yesterday 

afternoon.  She cannot qualify if her pain is sharp or dull but states her pain 

is aggravating.  She could not sleep all night due to persistent pain.  She 

also had nausea and vomiting with bilious vomitus.  No blood in the vomitus.  

Patient also had 3 bowel movements yesterday and no blood in the stool.  No 

changes in bladder habits.  Initial CT abdomen pelvis indicated possibility of 

volvulus.  However, a repeat abdomen and pelvis with oral contrast showed no 

evidence of volvulus.  Patient was not transferred to the main hospital after 

discussing with general surgery.





Review of Systems


Except as stated in HPI:  all other systems reviewed are Neg





Past Family Social History


Past Medical History


migraine headache, fibromyalgia, anxiety, hypothyroidism


Past Surgical History


Gastric bypass surgery, hysterectomy, right knee surgery, gastric bypass 

reversal.


Reported Medications


Carafate (Sucralfate) 1 Gm Tab 1 Gm PO QID


     On empty stomach


Reported


Omeprazole 40 Mg Cap 80 Mg PO DAILY


Clonazepam 1 Mg Tab 1 Mg PO HS


Neurontin (Gabapentin) 300 Mg Cap 900 Mg PO TID


Paroxetine (Paroxetine HCl) 10 Mg Tab 10 Mg PO DAILY


Ditropan (Oxybutynin Chloride) 5 Mg Tab 5 Mg PO DAILY


Levothyroxine (Levothyroxine Sodium) 75 Mcg Tab 75 Mcg PO DAILY


Duloxetine DR (Duloxetine HCl) 60 Mg Capdr 60 Mg PO DAILY


Topiramate 50 Mg Tab 75 Mg PO HS


Paroxetine (Paroxetine HCl) 40 Mg Tab 40 Mg PO DAILY


Allergies:  


Coded Allergies:  


     codeine (Unverified  Allergy, Intermediate, headache,n/v, 17)


     morphine (Unverified  Allergy, Intermediate, headache,n/v, 17)


     oxycodone (Unverified  Allergy, Intermediate, headaches,n/v, 17)


Family History


Mother had heart disease.  Father had pancreatic cancer.


Social History


Patient smokes about 1 pack a day.  Drinks socially.





Physical Exam


Vital Signs





Vital Signs








  Date Time  Temp Pulse Resp B/P (MAP) Pulse Ox O2 Delivery O2 Flow Rate FiO2


 


17 17:05     96   21


 


17 16:08        


 


17 15:31 99.5 74 16 144/74 (97) 98 Room Air  


 


17 13:13  76 16 144/81 (102) 98 Room Air  


 


17 11:36     97 Room Air  


 


17 10:58 98.4 91 16 185/98 (127) 98   








Physical Exam


GENERAL: This is a well-nourished, well-developed patient, in no apparent 

distress.


SKIN: No rashes, ecchymoses or lesions. Warm and dry.


HEAD: Atraumatic. Normocephalic. No temporal or scalp tenderness.


EYES: Pupils equal round and reactive. No injection or drainage. 


ENT: Nose without bleeding, purulent drainage or septal hematoma. Airway patent.


NECK: Trachea midline. No lymphadenopathy. Supple, nontender, no meningeal 

signs.


CARDIOVASCULAR: Regular rate and rhythm without murmurs, gallops, or rubs. No 

JVD. 


RESPIRATORY: Clear to auscultation. Breath sounds equal bilaterally. No wheezes

, rales, or rhonchi.  


GASTROINTESTINAL: Abdomen soft, tender to palpation, nondistended. No guarding.


MUSCULOSKELETAL: Extremities without clubbing, cyanosis, or edema. 


NEUROLOGICAL: Awake and alert. Cranial nerves II through XII intact. No focal 

neurological deficits. Normal speech.


Laboratory





Laboratory Tests








Test


  17


11:30 17


13:25


 


White Blood Count 15.4  


 


Red Blood Count 5.43  


 


Hemoglobin 17.0  


 


Hematocrit 52.2  


 


Mean Corpuscular Volume 96.2  


 


Mean Corpuscular Hemoglobin 31.4  


 


Mean Corpuscular Hemoglobin


Concent 32.6 


  


 


 


Red Cell Distribution Width 14.9  


 


Platelet Count 591  


 


Mean Platelet Volume 7.9  


 


Neutrophils (%) (Auto) 76.5  


 


Lymphocytes (%) (Auto) 12.1  


 


Monocytes (%) (Auto) 7.2  


 


Eosinophils (%) (Auto) 0.1  


 


Basophils (%) (Auto) 4.1  


 


Neutrophils # (Auto) 11.8  


 


Lymphocytes # (Auto) 1.9  


 


Monocytes # (Auto) 1.1  


 


Eosinophils # (Auto) 0.0  


 


Basophils # (Auto) 0.6  


 


CBC Comment DIFF FINAL  


 


Differential Comment   


 


Urine Collection Type CLEAN CATCH  


 


Urine Color YELLOW  


 


Urine Turbidity SLIGHT  


 


Urine pH 8.0  


 


Urine Specific Gravity 1.015  


 


Urine Protein 300 OR GREATER  


 


Urine Glucose (UA) NEG  


 


Urine Ketones NEG  


 


Urine Occult Blood MOD  


 


Urine Nitrite NEG  


 


Urine Bilirubin NEG  


 


Urine Leukocyte Esterase NEG  


 


Urine RBC 15-19  


 


Urine WBC 0-2  


 


Urine Squamous Epithelial


Cells 0-5 


  


 


 


Urine Renal Epithelial Cells 0-5  


 


Urine Amorphous Sediment LARGE  


 


Microscopic Urinalysis Comment


  CULT NOT


INDICATED 


 


 


Urine Collection Time 11:30  


 


Blood Urea Nitrogen 13  


 


Creatinine 0.95  


 


Random Glucose 163  


 


Total Protein 8.3  


 


Albumin 3.9  


 


Calcium Level 9.3  


 


Alkaline Phosphatase 81  


 


Aspartate Amino Transf


(AST/SGOT) 12 


  


 


 


Alanine Aminotransferase


(ALT/SGPT) 14 


  


 


 


Total Bilirubin 0.3  


 


Sodium Level 131  


 


Potassium Level 3.4  


 


Chloride Level 93  


 


Carbon Dioxide Level 29.3  


 


Anion Gap 9  


 


Estimat Glomerular Filtration


Rate 58 


  


 


 


Lipase 110  


 


Lactic Acid Level  1.7 














 Date/Time


Source Procedure


Growth Status


 


 


 17 13:25


Blood Peripheral Aerobic Blood Culture


Pending Received


 


 17 13:25


Blood Peripheral Anaerobic Blood Culture


Pending Received








Result Diagram:  


17 1130                                                                   

             17 1130





Imaging





Last Impressions








Abdomen/Pelvis CT 17 0000 Signed





Impressions: 





 Service Date/Time:  2017 14:13 - CONCLUSION: Distended 

loop 





 of bowel in the upper abdomen represents transverse colon indeterminate 

etiology 





 but not sigmoid volvulus. Patient would benefit from GI consult and possibly 





 barium enema for further evaluation     Melvin Stone, MD Caprini VTE Risk Assessment


Caprini VTE Risk Assessment:  Mod/High Risk (score >= 2)


Caprini Risk Assessment Model











 Point Value = 1          Point Value = 2  Point Value = 3  Point Value = 5


 


Age 41-60


Minor surgery


BMI > 25 kg/m2


Swollen legs


Varicose veins


Pregnancy or postpartum


History of unexplained or recurrent


   spontaneous 


Oral contraceptives or hormone


   replacement


Sepsis (< 1 month)


Serious lung disease, including


   pneumonia (< 1 month)


Abnormal pulmonary function


Acute myocardial infarction


Congestive heart failure (< 1 month)


History of inflammatory bowel disease


Medical patient at bed rest Age 61-74


Arthroscopic surgery


Major open surgery (> 45 min)


Laparoscopic surgery (> 45 min)


Malignancy


Confined to bed (> 72 hours)


Immobilizing plaster cast


Central venous access Age >= 75


History of VTE


Family history of VTE


Factor V Leiden


Prothrombin 23655F


Lupus anticoagulant


Anticardiolipin antibodies


Elevated serum homocysteine


Heparin-induced thrombocytopenia


Other congenital or acquired


   thrombophilia Stroke (< 1 month)


Elective arthroplasty


Hip, pelvis, or leg fracture


Acute spinal cord injury (< 1 month)








Prophylaxis Regimen











   Total Risk


Factor Score Risk Level Prophylaxis Regimen


 


0-1      Low Early ambulation


 


2 Moderate Order ONE of the following:


*Sequential Compression Device (SCD)


*Heparin 5000 units SQ BID


 


3-4 Higher Order ONE of the following medications:


*Heparin 5000 units SQ TID


*Enoxaparin/Lovenox 40 mg SQ daily (WT < 150 kg, CrCl > 30 mL/min)


*Enoxaparin/Lovenox 30 mg SQ daily (WT < 150 kg, CrCl > 10-29 mL/min)


*Enoxaparin/Lovenox 30 mg SQ BID (WT < 150 kg, CrCl > 30 mL/min)


AND/OR


*Sequential Compression Device (SCD)


 


5 or more Highest Order ONE of the following medications:


*Heparin 5000 units SQ TID (Preferred with Epidurals)


*Enoxaparin/Lovenox 40 mg SQ daily (WT < 150 kg, CrCl > 30 mL/min)


*Enoxaparin/Lovenox 30 mg SQ daily (WT < 150 kg, CrCl > 10-29 mL/min)


*Enoxaparin/Lovenox 30 mg SQ BID (WT < 150 kg, CrCl > 30 mL/min)


AND


*Sequential Compression Device (SCD)











Assessment and Plan


Problem List:  


(1) Abdominal pain


ICD Code:  R10.9 - Unspecified abdominal pain


Status:  Acute


(2) History of gastric bypass


ICD Code:  Z98.890 - Other specified postprocedural states


(3) Anxiety and depression


ICD Code:  F41.8 - Other specified anxiety disorders


Assessment and Plan


Ms. Painting is a 67-year-old  female with a history of gastric bypass 

and reversal who presents to the emergency department today due to severe 

abdominal pain, nausea vomiting that started yesterday afternoon.





- Abdominal pain


   - Etiology not determined.  


   - Repeat abdomen pelvis CT scan with oral contrast shows distended loop of 

bowel in the upper abdomen to presents transverse colon.  However no volvulus 

was present.


   - We'll give patient on normal saline at 100 cc per hour.  


   - Also keep patient on hydromorphone 0.5 mg every 4 hours when necessary.


   - WBC is elevated to 15.4. Lactic acid 1.7. Will repeat Lactic acid. 


   - Empirically start Cipro and Flagyl - both IV. If infectious etiology is 

not a huge concern, we can discontinue abx. 


   - If patient is able to tolerate PO, consider switching to PO abx as Flagyl 

IV is apparently on low stock. 


   - GI consult pending. 





- Anxiety/Depression


   - Continue clonazepam 1 mg by mouth daily at bedtime, duloxetine 60 mg daily.





- GERD


- Hypothyroidism


   - Continue PPI, levothyroxine 75 mg daily.





Full code. Lovenox.





Physician Certification


2 Midnight Certification Type:  Admission for Inpatient Services


Order for Inpatient Services


The services are ordered in accordance with Medicare regulations or non-

Medicare payer requirements, as applicable.  In the case of services not 

specified as inpatient-only, they are appropriately provided as inpatient 

services in accordance with the 2-midnight benchmark.


Estimated LOS (days):  2


 days is the estimated time the patient will need to remain in the hospital, 

assuming treatment plan goals are met and no additional complications.


Post-Hospital Plan:  Home





Problem Qualifiers





(1) Abdominal pain:  


Qualified Codes:  R10.13 - Epigastric pain








Delmis Mcdonald DO Aug 29, 2017 17:36

## 2017-08-29 NOTE — RADRPT
EXAM DATE/TIME:  08/29/2017 14:13 

 

[facility] COMPARISON:     CT ABDOMEN & PELVIS W/O CONTRAST, August 29, 2017, 11:47.

 

INDICATIONS :     Mid abdominal pain. Abnormal CT non contrast exam.

                      

IV CONTRAST:     85 cc Omnipaque 350 (iohexol) IV 

 

ORAL CONTRAST:      Prescribed oral contrast ingested.

                      

RADIATION DOSE:     6.18 CTDIvol (mGy) 

 

MEDICAL HISTORY :     None  

SURGICAL HISTORY :      Hysterectomy. Gastric bypass. 

ENCOUNTER:      Initial

ACUITY:      2 days

PAIN SCALE:      6/10

LOCATION:         abdomen

 

TECHNIQUE:     Volumetric scanning of the abdomen and pelvis was performed.  Using automated exposure
 control and adjustment of the mA and/or kV according to patient size, radiation dose was kept as low
 as reasonably achievable to obtain optimal diagnostic quality images.  DICOM format image data is av
ailable electronically for review and comparison.  

 

FINDINGS:     Contrast was given and followed through the majority of small bowel. Relative to prior 
CT scan earlier the same day the distended loop of bowel in the upper abdomen appears to represent co
tahir most likely transverse. The right colon and cecum are visualized and this does not represent a ce
sang volvulus.

 

 

CONCLUSION:     Distended loop of bowel in the upper abdomen represents transverse colon indeterminat
e etiology but not sigmoid volvulus. Patient would benefit from GI consult and possibly barium enema 
for further evaluation 

 

 

 Alex Myers MD on August 29, 2017 at 14:27           

Board Certified Radiologist.

 This report was verified electronically.

## 2017-08-29 NOTE — PD
HPI


Chief Complaint:  Abdominal Pain


Time Seen by Provider:  11:04


Travel History


International Travel<30 days:  No


Contact w/Intl Traveler<30days:  No


Traveled to known affect area:  No





History of Present Illness


HPI


67-year-old female is noting progressive epigastric abdominal pain since she 

was here.  She states that Carafate liquid usually works better for her than 

pills but this pain is gotten to the point that it is worse and she cannot 

tolerate it.  She is worried something more severe is going on.  Quality pain 

is sharp.  Severity severe per patient.  She denies specific modifying factors.

  She denies other concurrent complaints.





PFSH


Past Medical History


Anxiety:  Yes


Heart Rhythm Problems:  Yes (A FIB)


Cardiovascular Problems:  Yes (hx of a-fib)


Diminished Hearing:  Yes


Fibromyalgia:  Yes


Headaches:  Yes (MIGRAINES)


Ulcer:  Yes





Past Surgical History


Abdominal Surgery:  Yes (bypass)


Hysterectomy:  Yes


Joint Replacement:  Yes (right knee)


Other Surgery:  Yes (GASTRIC BYPASS AND REVERSAL)





Social History


Alcohol Use:  Yes


Tobacco Use:  Yes (10 CIGS PER DAY)


Substance Use:  No





Allergies-Medications


(Allergen,Severity, Reaction):  


Coded Allergies:  


     codeine (Unverified  Allergy, Intermediate, headache,n/v, 8/29/17)


     morphine (Unverified  Allergy, Intermediate, headache,n/v, 8/29/17)


     oxycodone (Unverified  Allergy, Intermediate, headaches,n/v, 8/29/17)


Reported Meds & Prescriptions





Reported Meds & Active Scripts


Active


Carafate (Sucralfate) 1 Gm Tab 1 Gm PO QID


     On empty stomach


Reported


Omeprazole 40 Mg Cap 80 Mg PO DAILY


Clonazepam 1 Mg Tab 1 Mg PO HS


Neurontin (Gabapentin) 300 Mg Cap 900 Mg PO TID


Paroxetine (Paroxetine HCl) 10 Mg Tab 10 Mg PO DAILY


Ditropan (Oxybutynin Chloride) 5 Mg Tab 5 Mg PO DAILY


Levothyroxine (Levothyroxine Sodium) 75 Mcg Tab 75 Mcg PO DAILY


Duloxetine DR (Duloxetine HCl) 60 Mg Capdr 60 Mg PO DAILY


Topiramate 50 Mg Tab 75 Mg PO HS


Paroxetine (Paroxetine HCl) 40 Mg Tab 40 Mg PO DAILY








Review of Systems


Except as stated in HPI:  all other systems reviewed are Neg





Physical Exam


Narrative


GENERAL: Well-nourished, well-developed patient.  Uncomfortable


SKIN: Warm and dry.


HEAD: Normocephalic and atraumatic.


EYES: No injection or drainage. 


ENT: No nasal drainage noted. 


NECK: Supple, trachea midline.


CARDIOVASCULAR: Regular rate and rhythm 


RESPIRATORY: Breath sounds equal bilaterally. No accessory muscle use.


GASTROINTESTINAL: Abdomen soft, tender in epigastric area, nondistended. 


EXTREMITIES: No edema.


NEUROLOGICAL: Awake and alert. Motor and sensory grossly within normal limits. 

Normal speech.





Data


Data


Last Documented VS





Vital Signs








  Date Time  Temp Pulse Resp B/P (MAP) Pulse Ox O2 Delivery O2 Flow Rate FiO2


 


8/29/17 11:36     97 Room Air  


 


8/29/17 10:58 98.4 91 16 185/98 (127)    








Orders





 Orders


Complete Blood Count With Diff (8/29/17 11:15)


Comprehensive Metabolic Panel (8/29/17 11:15)


Urinalysis - C+S If Indicated (8/29/17 11:15)


Lipase (8/29/17 11:15)


Iv Access Insert/Monitor (8/29/17 11:15)


Oximetry (8/29/17 11:15)


Pantoprazole Inj (Protonix Inj) (8/29/17 11:15)


Ct Abd/Pel W/O Iv Contrast (8/29/17 )


Hydromorphone Pf Inj (Dilaudid Pf Inj) (8/29/17 11:15)


Ondansetron Inj (Zofran Inj) (8/29/17 11:15)


Sodium Chlorid 0.9% 500 Ml Inj (Ns 500 M (8/29/17 11:15)


Ct Abd/Pel W Iv Contrast(Rout) (8/29/17 )


Diatrizoate Liq (Md Gastroview Liq) (8/29/17 13:02)


Ondansetron Inj (Zofran Inj) (8/29/17 13:15)


Admit Order (Ed Use Only) (8/29/17 13:10)





Labs





Laboratory Tests








Test


  8/29/17


11:30


 


White Blood Count 15.4 TH/MM3 


 


Red Blood Count 5.43 MIL/MM3 


 


Hemoglobin 17.0 GM/DL 


 


Hematocrit 52.2 % 


 


Mean Corpuscular Volume 96.2 FL 


 


Mean Corpuscular Hemoglobin 31.4 PG 


 


Mean Corpuscular Hemoglobin


Concent 32.6 % 


 


 


Red Cell Distribution Width 14.9 % 


 


Platelet Count 591 TH/MM3 


 


Mean Platelet Volume 7.9 FL 


 


Neutrophils (%) (Auto) 76.5 % 


 


Lymphocytes (%) (Auto) 12.1 % 


 


Monocytes (%) (Auto) 7.2 % 


 


Eosinophils (%) (Auto) 0.1 % 


 


Basophils (%) (Auto) 4.1 % 


 


Neutrophils # (Auto) 11.8 TH/MM3 


 


Lymphocytes # (Auto) 1.9 TH/MM3 


 


Monocytes # (Auto) 1.1 TH/MM3 


 


Eosinophils # (Auto) 0.0 TH/MM3 


 


Basophils # (Auto) 0.6 TH/MM3 


 


CBC Comment DIFF FINAL 


 


Differential Comment  


 


Urine Collection Type CLEAN CATCH 


 


Urine Color YELLOW 


 


Urine Turbidity SLIGHT 


 


Urine pH 8.0 


 


Urine Specific Gravity 1.015 


 


Urine Protein


  300 OR GREATER


mg/dL


 


Urine Glucose (UA) NEG mg/dL 


 


Urine Ketones NEG mg/dL 


 


Urine Occult Blood MOD 


 


Urine Nitrite NEG 


 


Urine Bilirubin NEG 


 


Urine Leukocyte Esterase NEG 


 


Urine RBC 15-19 /hpf 


 


Urine WBC 0-2 /hpf 


 


Urine Squamous Epithelial


Cells 0-5 /hpf 


 


 


Urine Renal Epithelial Cells 0-5 /hpf 


 


Urine Amorphous Sediment LARGE 


 


Microscopic Urinalysis Comment


  CULT NOT


INDICATED


 


Urine Collection Time 11:30 


 


Blood Urea Nitrogen 13 MG/DL 


 


Creatinine 0.95 MG/DL 


 


Random Glucose 163 MG/DL 


 


Total Protein 8.3 GM/DL 


 


Albumin 3.9 GM/DL 


 


Calcium Level 9.3 MG/DL 


 


Alkaline Phosphatase 81 U/L 


 


Aspartate Amino Transf


(AST/SGOT) 12 U/L 


 


 


Alanine Aminotransferase


(ALT/SGPT) 14 U/L 


 


 


Total Bilirubin 0.3 MG/DL 


 


Sodium Level 131 MEQ/L 


 


Potassium Level 3.4 MEQ/L 


 


Chloride Level 93 MEQ/L 


 


Carbon Dioxide Level 29.3 MEQ/L 


 


Anion Gap 9 MEQ/L 


 


Estimat Glomerular Filtration


Rate 58 ML/MIN 


 


 


Lipase 110 U/L 











[facility]


Medical Decision Making


Medical Screen Exam Complete:  Yes


Emergency Medical Condition:  Yes


Medical Record Reviewed:  Yes (past history confirm, recent ER visit reviewed 

with lab work within normal limits)


Interpretation(s)


CBC & BMP Diagram


8/29/17 11:30








Total Protein 8.3 H, Albumin 3.9, Calcium Level 9.3, Alkaline Phosphatase 81, 

Aspartate Amino Transf (AST/SGOT) 12 L, Alanine Aminotransferase (ALT/SGPT) 14, 

Total Bilirubin 0.3








Last 24 hours Impressions








Abdomen/Pelvis CT 8/29/17 0000 Signed





Impressions: 





 Service Date/Time:  Tuesday, August 29, 2017 11:47 - CONCLUSION:  There are 





 interim surgical staples in the small bowel in the pelvic inlet right abdomen 





 with an isolated loop of prominent small bowel. Additionally in the upper 





 abdomen there is a comma shaped loop of bowel with maximum width being 





 approximately a centimeters of its left side and on the right gradually 

tapering 





 into a narrow area. This latter finding suggest the possibility of a volvulus 

   





                         No evidence of renal or ureteral calculi or 

obstructive 





 uropathy.     Alex Myers MD 





Discussed with radiologist who recommends long contrast by mouth study versus 

barium enema


ct with oral contrast no volvulus, distended bowel loop noted question etiology


Differential Diagnosis


Ulcer, gastritis, pancreatitis, stone, musculoskeletal


Narrative Course


Will recheck blood work and check CT abdomen pelvis given extent of pain and 

dose with Dilaudid and Zofran and reevaluate





On reexamination patient is starting to feel better after Dilaudid, updated 

about CT and confirmed prior surgeries with initial gastric bypass in 2011 with 

reversal in 2013.  She states she's had probably totaled 15 surgeries given 

issues with her bypass but the most recent was in 2013. will check lactic and 

dose with one dose of zosyn while awaiting testing





1315 patient updated and agrees to care, zofran given for nausea, nurse 

informed and giving contrast, patient will be admitted





Physician Communication


Physician Communication


1234 dr desir states will review images and call me back, will need to be in 

[facility]


1300 discussed with dr desir again and states to give oral contrast for 

better visualization


1310 talked with Dr. Desir and after review of bed availability at [facility] he 

wants patient admitted to medicine now in [facility] and work on getting her 

transferred up here as soon as possible while awaiting study


1450 dr desir updated about ct and given no volvulus on ct can stay in [place] 

[place] and cancel surgery consult


dr lin updated that unable to get hold of gi for almost 2 hours and consult 

placed in computer and will change to [place] for admission





Diagnosis





 Primary Impression:  


 Abdominal pain


 Qualified Codes:  R10.13 - Epigastric pain


 Additional Impression:  


 Leukocytosis


 Qualified Codes:  D72.829 - Elevated white blood cell count, unspecified





Admitting Information


Admitting Physician Requests:  Admit











Kristan Castro MD Aug 29, 2017 11:38

## 2017-08-29 NOTE — RADRPT
EXAM DATE/TIME:  08/29/2017 11:47 

 

[facility] COMPARISON:     

CT ABDOMEN & PELVIS W/O CONTRAST, February 19, 2017, 17:49.

 

 

INDICATIONS :     

Mid abdominal pain. Evaluate for calculi.

                  

 

ORAL CONTRAST:      

No oral contrast ingested.

                  

 

RADIATION DOSE:     

7.93 CTDIvol (mGy) 

 

 

MEDICAL HISTORY :     

None  

 

SURGICAL HISTORY :      

Gastric bypass. 

 

ENCOUNTER:      

Initial

 

ACUITY:      

2 days

 

PAIN SCALE:      

7/10

 

LOCATION:         

abdomen

 

TECHNIQUE:     

Volumetric scanning of the abdomen and pelvis was performed.  Using automated exposure control and ad
justment of the mA and/or kV according to patient size, radiation dose was kept as low as reasonably 
achievable to obtain optimal diagnostic quality images.  DICOM format image data is available electro
nically for review and comparison.  

 

FINDINGS:     

Postoperative changes of gastric bypass surgery appreciated as well as ventral hernia repair and hyst
erectomy. Additionally in the antrum there is apparently surgical clips in the region of small bowel 
at the pelvic inlet. This isolated loop of small bowel which is somewhat prominent remainder small hernando
wel being prominent. In the upper abdomen there is a condylar shaped loop of bowel which in one proje
ction to the left has a increased width of 8 cm and tapers toward the right abdomen. Tension this cou
ld represent some type of volvulus.

 

 

CONCLUSION:     

There are interim surgical staples in the small bowel in the pelvic inlet right abdomen with an isola
jena loop of prominent small bowel. Additionally in the upper abdomen there is a comma shaped loop of 
bowel with maximum width being approximately a centimeters of its left side and on the right graduall
y tapering into a narrow area. This latter finding suggest the possibility of a volvulus 

 

                         No evidence of renal or ureteral calculi or obstructive uropathy. 

 

 

 Alex Myers MD on August 29, 2017 at 11:57           

Board Certified Radiologist.

 This report was verified electronically.

## 2017-08-30 VITALS
DIASTOLIC BLOOD PRESSURE: 75 MMHG | OXYGEN SATURATION: 96 % | RESPIRATION RATE: 18 BRPM | TEMPERATURE: 97.5 F | HEART RATE: 74 BPM | SYSTOLIC BLOOD PRESSURE: 180 MMHG

## 2017-08-30 VITALS
HEART RATE: 74 BPM | DIASTOLIC BLOOD PRESSURE: 68 MMHG | SYSTOLIC BLOOD PRESSURE: 128 MMHG | OXYGEN SATURATION: 98 % | RESPIRATION RATE: 20 BRPM | TEMPERATURE: 96.8 F

## 2017-08-30 VITALS
SYSTOLIC BLOOD PRESSURE: 143 MMHG | RESPIRATION RATE: 20 BRPM | HEART RATE: 78 BPM | TEMPERATURE: 98.5 F | DIASTOLIC BLOOD PRESSURE: 85 MMHG | OXYGEN SATURATION: 98 %

## 2017-08-30 VITALS
SYSTOLIC BLOOD PRESSURE: 127 MMHG | RESPIRATION RATE: 16 BRPM | TEMPERATURE: 98 F | OXYGEN SATURATION: 95 % | HEART RATE: 76 BPM | DIASTOLIC BLOOD PRESSURE: 77 MMHG

## 2017-08-30 VITALS — HEART RATE: 81 BPM | RESPIRATION RATE: 18 BRPM | OXYGEN SATURATION: 97 % | TEMPERATURE: 97.9 F

## 2017-08-30 VITALS — OXYGEN SATURATION: 95 %

## 2017-08-30 VITALS — DIASTOLIC BLOOD PRESSURE: 100 MMHG | SYSTOLIC BLOOD PRESSURE: 180 MMHG

## 2017-08-30 LAB
ANION GAP SERPL CALC-SCNC: 8 MEQ/L (ref 5–15)
BASOPHILS # BLD AUTO: 0 TH/MM3 (ref 0–0.2)
BASOPHILS NFR BLD: 0.4 % (ref 0–2)
BUN SERPL-MCNC: 10 MG/DL (ref 7–18)
CHLORIDE SERPL-SCNC: 102 MEQ/L (ref 98–107)
EOSINOPHIL # BLD: 0 TH/MM3 (ref 0–0.4)
EOSINOPHIL NFR BLD: 0.3 % (ref 0–4)
ERYTHROCYTE [DISTWIDTH] IN BLOOD BY AUTOMATED COUNT: 16 % (ref 11.6–17.2)
GFR SERPLBLD BASED ON 1.73 SQ M-ARVRAT: 86 ML/MIN (ref 89–?)
HCO3 BLD-SCNC: 26.8 MEQ/L (ref 21–32)
HCT VFR BLD CALC: 51.5 % (ref 35–46)
HEMO FLAGS: (no result)
LYMPHOCYTES # BLD AUTO: 1.4 TH/MM3 (ref 1–4.8)
LYMPHOCYTES NFR BLD AUTO: 12.8 % (ref 9–44)
MCH RBC QN AUTO: 31.7 PG (ref 27–34)
MCHC RBC AUTO-ENTMCNC: 31.8 % (ref 32–36)
MCV RBC AUTO: 99.6 FL (ref 80–100)
MONOCYTES NFR BLD: 7.2 % (ref 0–8)
NEUTROPHILS # BLD AUTO: 8.4 TH/MM3 (ref 1.8–7.7)
NEUTROPHILS NFR BLD AUTO: 79.3 % (ref 16–70)
PLATELET # BLD: 487 TH/MM3 (ref 150–450)
POTASSIUM SERPL-SCNC: 2.9 MEQ/L (ref 3.5–5.1)
RBC # BLD AUTO: 5.17 MIL/MM3 (ref 4–5.3)
SODIUM SERPL-SCNC: 137 MEQ/L (ref 136–145)
WBC # BLD AUTO: 10.6 TH/MM3 (ref 4–11)

## 2017-08-30 NOTE — HHI.GIFU
Subjective


Remarks


Patient feeling better better today, less abdominal pain no nausea.





Objective


Vitals I&O





Vital Signs








  Date Time  Temp Pulse Resp B/P (MAP) Pulse Ox O2 Delivery O2 Flow Rate FiO2


 


8/30/17 16:00 96.8 74 20 128/68 (88) 98   


 


8/30/17 13:10    180/100 (126)    


 


8/30/17 12:00 97.9 81 18  97   


 


8/30/17 08:25     95   21


 


8/30/17 08:00 97.5 74 18 180/75 (110) 96   


 


8/30/17 00:00 98.0 76 16 127/77 (94) 95   


 


8/29/17 21:30     94   21


 


8/29/17 20:00 99.2 73 16 146/82 (103) 94   














I/O      


 


 8/29/17 8/29/17 8/29/17 8/30/17 8/30/17 8/30/17





 07:00 15:00 23:00 07:00 15:00 23:00


 


Intake Total  600 ml 300 ml 1176 ml 500 ml 1000 ml


 


Balance  600 ml 300 ml 1176 ml 500 ml 1000 ml


 


      


 


Intake Oral    380 ml  


 


IV Total  600 ml 300 ml 796 ml 500 ml 1000 ml


 


# Voids    2  


 


# Bowel Movements    0  








Laboratory





Laboratory Tests








Test


  8/29/17


19:30 8/30/17


05:55


 


Lactic Acid Level 0.8  


 


White Blood Count  10.6 


 


Red Blood Count  5.17 


 


Hemoglobin  16.4 


 


Hematocrit  51.5 


 


Mean Corpuscular Volume  99.6 


 


Mean Corpuscular Hemoglobin  31.7 


 


Mean Corpuscular Hemoglobin


Concent 


  31.8 


 


 


Red Cell Distribution Width  16.0 


 


Platelet Count  487 


 


Mean Platelet Volume  8.4 


 


Neutrophils (%) (Auto)  79.3 


 


Lymphocytes (%) (Auto)  12.8 


 


Monocytes (%) (Auto)  7.2 


 


Eosinophils (%) (Auto)  0.3 


 


Basophils (%) (Auto)  0.4 


 


Neutrophils # (Auto)  8.4 


 


Lymphocytes # (Auto)  1.4 


 


Monocytes # (Auto)  0.8 


 


Eosinophils # (Auto)  0.0 


 


Basophils # (Auto)  0.0 


 


CBC Comment  DIFF FINAL 


 


Differential Comment   


 


Blood Urea Nitrogen  10 


 


Creatinine  0.68 


 


Random Glucose  157 


 


Calcium Level  8.4 


 


Sodium Level  137 


 


Potassium Level  2.9 


 


Chloride Level  102 


 


Carbon Dioxide Level  26.8 


 


Anion Gap  8 


 


Estimat Glomerular Filtration


Rate 


  86 


 














 Date/Time


Source Procedure


Growth Status


 


 


 8/29/17 13:25


Blood Peripheral Aerobic Blood Culture - Preliminary


NO GROWTH IN 1 DAY Resulted


 


 8/29/17 13:25


Blood Peripheral Anaerobic Blood Culture - Preliminary


NO GROWTH IN 1 DAY Resulted








Physical Exam


HEENT: Pupils round and reactive to light; normocephalic; atraumatic; no 

jaundice.  Throat is clear.


NECK: Neck is supple, no JVD, no lymphadenopathy.


CHEST:  Chest is clear to auscultation and percussion.


CARDIAC:  Regular rate and rhythm with no murmur gallop or rubs.


ABDOMEN:  Soft, nondistended, minimal midepigastric tenderness; no 

hepatosplenomegaly; bowel sounds are present in all four quadrants.


EXTREMITIES: No clubbing, cyanosis, or edema.


SKIN:  Normal; no rash; no jaundice.


CNS:  No focal deficits; alert and oriented times three.





Assessment and Plan


Plan


Patient is here because of abdominal pain which was sudden nausea vomiting, 

patient has multiple surgical procedures in the past including gastric bypass 

and revision of the bypass


Possibly partial small bowel obstruction versus gastroenteritis which seems to 

be resolving





Recommendations





Advance diet as tolerated starting with clear liquid


Consider stopping antibiotic


If no pain and tolerating food can be discharged otherwise we need a KUB to 

make sure that there is no ileus versus partial obstruction persisting


If the KUB is negative and continuous symptoms then she will need an upper 

endoscopy











Seymour Humphrey MD Aug 30, 2017 18:41

## 2017-08-30 NOTE — HHI.PR
Subjective


Remarks


F/u for abd pain. Patient says she feels better, says that nausea medication 

premedication working.  Denies any diarrhea or having any bowel movements for 

that matter.  Denies vomiting, says she doesn't have an appetite for chicken 

broth, has only been in taking water and juices, wants to advance her diet.  

Discussed with nursing, had isolated hypertension earlier today with 100 at 

least diastolic, ordered a one-time hydralazine by mouth





Objective





Vital Signs








  Date Time  Temp Pulse Resp B/P (MAP) Pulse Ox O2 Delivery O2 Flow Rate FiO2


 


8/30/17 13:10    180/100 (126)    


 


8/30/17 12:00 97.9 81 18  97   


 


8/30/17 08:25     95   21


 


8/30/17 08:00 97.5 74 18 180/75 (110) 96   


 


8/30/17 00:00 98.0 76 16 127/77 (94) 95   


 


8/29/17 21:30     94   21


 


8/29/17 20:00 99.2 73 16 146/82 (103) 94   


 


8/29/17 17:08 98.0 73 18 139/75 (96) 96   


 


8/29/17 17:05     96   21














I/O      


 


 8/29/17 8/29/17 8/29/17 8/30/17 8/30/17 8/30/17





 06:59 14:59 22:59 06:59 14:59 22:59


 


Intake Total  600 ml 100 ml 1376 ml 500 ml 


 


Balance  600 ml 100 ml 1376 ml 500 ml 


 


      


 


Intake Oral    380 ml  


 


IV Total  600 ml 100 ml 996 ml 500 ml 


 


# Voids    2  


 


# Bowel Movements    0  








Result Diagram:  


8/30/17 0555                                                                   

             8/30/17 0555





Imaging





Last Impressions








Abdomen/Pelvis CT 8/29/17 0000 Signed





Impressions: 





 Service Date/Time:  Tuesday, August 29, 2017 14:13 - CONCLUSION: Distended 

loop 





 of bowel in the upper abdomen represents transverse colon indeterminate 

etiology 





 but not sigmoid volvulus. Patient would benefit from GI consult and possibly 





 barium enema for further evaluation     Alex Myers MD 








Objective Remarks


GENERAL: No acute distress


CARDIOVASCULAR: Regular rate and rhythm without murmurs, gallops, or rubs. 


RESPIRATORY: Breath sounds equal and clear bilaterally.  Unlabored breathing


GASTROINTESTINAL: Abdomen soft, mild-mod suprapubic TTP, ND


MUSCULOSKELETAL: No cyanosis, or edema.





A/P


Assessment and Plan


Ms. Painting is a 67-year-old  female with a history of gastric bypass 

and reversal who presents to the emergency department today due to severe 

abdominal pain, nausea vomiting that started yesterday afternoon.





- Abdominal pain


   - improved. GI following appreciate recs, suspect viral GE. 


   - consider de-escalating or discontinuing abx bri if afebrile. IV fluids





- Anxiety/Depression


   - Continue clonazepam 1 mg by mouth daily at bedtime, duloxetine 60 mg daily.





hypokalemia - trend in AM, replace as necessary 





- GERD


- Hypothyroidism


   - Continue PPI, levothyroxine 75 mg daily.





Full code. Lovenox.











Marquis Feng MD Aug 30, 2017 16:59

## 2017-08-31 VITALS
OXYGEN SATURATION: 97 % | RESPIRATION RATE: 18 BRPM | TEMPERATURE: 97.2 F | DIASTOLIC BLOOD PRESSURE: 102 MMHG | SYSTOLIC BLOOD PRESSURE: 170 MMHG | HEART RATE: 70 BPM

## 2017-08-31 VITALS
DIASTOLIC BLOOD PRESSURE: 67 MMHG | OXYGEN SATURATION: 96 % | HEART RATE: 88 BPM | SYSTOLIC BLOOD PRESSURE: 125 MMHG | RESPIRATION RATE: 18 BRPM | TEMPERATURE: 98.4 F

## 2017-08-31 VITALS
SYSTOLIC BLOOD PRESSURE: 137 MMHG | DIASTOLIC BLOOD PRESSURE: 72 MMHG | TEMPERATURE: 98 F | OXYGEN SATURATION: 97 % | RESPIRATION RATE: 18 BRPM | HEART RATE: 79 BPM

## 2017-08-31 VITALS
HEART RATE: 70 BPM | TEMPERATURE: 98.1 F | OXYGEN SATURATION: 94 % | RESPIRATION RATE: 16 BRPM | SYSTOLIC BLOOD PRESSURE: 109 MMHG | DIASTOLIC BLOOD PRESSURE: 65 MMHG

## 2017-08-31 VITALS
HEART RATE: 78 BPM | OXYGEN SATURATION: 96 % | SYSTOLIC BLOOD PRESSURE: 190 MMHG | TEMPERATURE: 98.2 F | DIASTOLIC BLOOD PRESSURE: 100 MMHG | RESPIRATION RATE: 16 BRPM

## 2017-08-31 LAB
ANION GAP SERPL CALC-SCNC: 10 MEQ/L (ref 5–15)
BUN SERPL-MCNC: 9 MG/DL (ref 7–18)
CHLORIDE SERPL-SCNC: 104 MEQ/L (ref 98–107)
GFR SERPLBLD BASED ON 1.73 SQ M-ARVRAT: 112 ML/MIN (ref 89–?)
HCO3 BLD-SCNC: 23.1 MEQ/L (ref 21–32)
HEMOGLOBIN A1A: 1.5 %
HEMOGLOBIN A1B: 1.6 %
HEMOGLOBIN AO: 84.4 %
HEMOGLOBIN LA1C: 2.4 %
HEMOGLOBIN P3: 3.9 %
MAGNESIUM SERPL-MCNC: 2 MG/DL (ref 1.5–2.5)
POTASSIUM SERPL-SCNC: 3.4 MEQ/L (ref 3.5–5.1)
SODIUM SERPL-SCNC: 137 MEQ/L (ref 136–145)

## 2017-08-31 NOTE — HHI.GIFU
Subjective


Remarks


Patient complaining of having more nausea and Since last night with more 

abdominal cramping, She also feel more bloated and distended





Objective


Vitals I&O





Vital Signs








  Date Time  Temp Pulse Resp B/P (MAP) Pulse Ox O2 Delivery O2 Flow Rate FiO2


 


8/31/17 12:58   18     


 


8/31/17 12:00 98.2 78 16 190/100 (130) 96   


 


8/31/17 08:00 98.4 88 18 125/67 (86) 96   


 


8/31/17 00:00 98.0 79 18 137/72 (93) 97   


 


8/30/17 20:00 98.5 78 20 143/85 (104) 98   


 


8/30/17 16:00 96.8 74 20 128/68 (88) 98   














I/O      


 


 8/30/17 8/30/17 8/30/17 8/31/17 8/31/17 8/31/17





 07:00 15:00 23:00 07:00 15:00 23:00


 


Intake Total 1176 ml 500 ml 1320 ml 1220 ml  


 


Balance 1176 ml 500 ml 1320 ml 1220 ml  


 


      


 


Intake Oral 380 ml  120 ml 120 ml  


 


IV Total 796 ml 500 ml 1200 ml 1100 ml  


 


# Voids 2  4 0  


 


# Bowel Movements 0  0 0  








Laboratory





Laboratory Tests








Test


  8/31/17


14:55














 Date/Time


Source Procedure


Growth Status


 


 


 8/29/17 13:25


Blood Peripheral Aerobic Blood Culture - Preliminary


NO GROWTH IN 2 DAYS Resulted


 


 8/29/17 13:25


Blood Peripheral Anaerobic Blood Culture - Preliminary


NO GROWTH IN 2 DAYS Resulted








Physical Exam


HEENT: Pupils round and reactive to light; normocephalic; atraumatic; no 

jaundice.  Throat is clear.


NECK: Neck is supple, no JVD, no lymphadenopathy.


CHEST:  Chest is clear to auscultation and percussion.


CARDIAC:  Regular rate and rhythm with no murmur gallop or rubs.


ABDOMEN:  Soft, Mildly distended, More midepigastric tenderness And throughout 

the abdomen; no hepatosplenomegaly; bowel sounds are present in all four 

quadrants.


EXTREMITIES: No clubbing, cyanosis, or edema.


SKIN:  Normal; no rash; no jaundice.


CNS:  No focal deficits; alert and oriented times three.





Assessment and Plan


Plan


Patient is here because of abdominal pain which was sudden nausea vomiting, 

patient has multiple surgical procedures in the past including gastric bypass 

and revision of the bypass


Possibly partial small bowel obstruction versus gastroenteritis \, Patient 

getting worse today with more discomfort and nausea and slightly more distended 

abdomen





Recommendations





Hold diet


KUB today to rule out possible obstruction, if this is negative will proceed 

with upper endoscopy to rule out gastritis or esophagitis


Seymour Garcia MD Aug 31, 2017 15:14

## 2017-08-31 NOTE — HHI.PR
Subjective


Remarks


Patient seen and examined today for follow-up on abdominal pain.  Patient 

states that she really has not ate anything because she still experiencing 

excruciating abdominal pain.  Discussed the case with gastroenterologist to 

recommended abdominal x-ray which does indicate dilated cecum and ascending 

colon.





Objective


Vitals





Vital Signs








  Date Time  Temp Pulse Resp B/P (MAP) Pulse Ox O2 Delivery O2 Flow Rate FiO2


 


8/31/17 12:58   18     


 


8/31/17 12:00 98.2 78 16 190/100 (130) 96   


 


8/31/17 08:00 98.4 88 18 125/67 (86) 96   


 


8/31/17 00:00 98.0 79 18 137/72 (93) 97   


 


8/30/17 20:00 98.5 78 20 143/85 (104) 98   


 


8/30/17 16:00 96.8 74 20 128/68 (88) 98   














I/O      


 


 8/30/17 8/30/17 8/30/17 8/31/17 8/31/17 8/31/17





 07:00 15:00 23:00 07:00 15:00 23:00


 


Intake Total 1176 ml 500 ml 1320 ml 1220 ml  


 


Balance 1176 ml 500 ml 1320 ml 1220 ml  


 


      


 


Intake Oral 380 ml  120 ml 120 ml  


 


IV Total 796 ml 500 ml 1200 ml 1100 ml  


 


# Voids 2  4 0  


 


# Bowel Movements 0  0 0  








Result Diagram:  


8/30/17 0555 8/30/17 0555





Objective Remarks


GENERAL: Well-developed, cachectic, in no acute distress. alert and orientated


HEENT: Head is normocephalic without any lesions or masses noted. Facial 

features are symmetric. Eyes: Extraocular muscles are intact. Conjunctivae were 

clear.


NECK: Supple without any masses. Trachea midline no deviation. No JVD,


CARDIAC: Regular rhythm, regular rate. S1/S2 are heard.  2 or 6 ejection murmur

, no gallops or rubs.


LUNGS: Clear to auscultation bilaterally. No wheeze, rhonchi or rales. No use 

of accessory muscles on inspiration or expiration.


ABDOMEN: Soft, mild tenderness noted in the left abdomen. Nondistended. Bowel 

sounds heard in all 4 quadrants. No organomegaly or masses. Negative rebound, 

negative guarding


EXTREMITIES: No edema, pulses are equal bilaterally. No cyanosis or clubbing


NEUROLOGY: Mood and affect appear appropriate. Cranial nerves II through XII 

grossly intact.  Moving all extremities, speech is clear


Urinary Catheter:  No


Vascular Central Line Catheter:  No





A/P


Assessment and Plan


Abdominal pain, persistent


   Multiple CT scan of the abdomen shows loops of prominent small bowel, with, 

shaped loop of bowel with maximum width being approximately 8 cm of the left 

side and right side gradually tapering to a narrow area.  Suggesting volvulus


   Repeat CT with contrast shows distended loop bowel in the upper abdomen 

represents transverse colon but no volvulus.


   Flat and upright abdominal x-ray shows distention of the ascending colon and 

proximal, transverse colon.  There are air-fluid levels in the upright view.  

Nondilated small bowel.  The cecum and ascending colon are prominent in size.


   Gastroenterologist was consulted and was recommending the abdominal x-ray 

and possible endoscopy.  However with the new findings on x-ray it was 

recommended patient undergo a Gastrografin enema for diagnostic/therapeutic 

reasons





Leukocytosis


   Significantly improved


   Monitor CBC





Electrolyte abnormality with hyponatremia, hypokalemia


   Continue monitor and replete as needed





Hyperglycemia


   Check hemoglobin A1c





Anxiety/Depression


   Continue home medications to include clonazepam 1 mg by mouth daily at 

bedtime, duloxetine 60 mg daily.





Hypothyroidism


   Continue home medication levothyroxine 75 mg daily.





DVT prevention


   Lovenox.





Full code


Discharge Planning


Discharge planning 24-48 hours depending on patient's response to treatment and 

findings from Gastrografin enema











Brando Stauffer Aug 31, 2017 14:26

## 2017-08-31 NOTE — RADRPT
EXAM DATE/TIME:  08/31/2017 12:34 

 

[facility] COMPARISON:     

CT ABDOMEN & PELVIS W CONTRAST, August 29, 2017, 14:13.

 

                     

INDICATIONS :     

Abdominal pain,

                     

 

MEDICAL HISTORY :     

Gastroesophageal reflux disease.  Ulcers.  Arthritis.   A-fib. Seizure. Fibromyalgia.   

 

SURGICAL HISTORY :       

Total knee replacement, right. Hysterectomy. Gastric bypass.

 

ENCOUNTER:     

Subsequent                                        

 

ACUITY:     

4 - 6 days      

 

PAIN SCORE:     

9/10

 

LOCATION:       

abdomen

 

FINDINGS:     

Supine and upright views the abdomen show oral contrast within the ascending colon and proximal trans
verse colon. This generates some air-fluid levels on the upright view. Gas filled loops of nondilated
 small bowel. No dilated loops of bowel observed. The cecum and ascending colon are prominent in size
 but are smaller from the prior CT. No pneumoperitoneum. Lung bases are clear. Anchoring devices from
 prior ventral hernia repair noted. A scoliotic and degenerative spine.

 

CONCLUSION:     

Mild gaseous distention of the ascending colon but this is less distended than on the prior study. Ot
herwise, unremarkable exam.

 

 

 

 Blanco Alvarado Jr., MD on August 31, 2017 at 12:48           

Board Certified Radiologist.

 This report was verified electronically.

## 2017-09-01 VITALS
TEMPERATURE: 96.6 F | DIASTOLIC BLOOD PRESSURE: 101 MMHG | OXYGEN SATURATION: 95 % | RESPIRATION RATE: 16 BRPM | HEART RATE: 75 BPM | SYSTOLIC BLOOD PRESSURE: 199 MMHG

## 2017-09-01 VITALS
TEMPERATURE: 98.4 F | OXYGEN SATURATION: 95 % | HEART RATE: 75 BPM | DIASTOLIC BLOOD PRESSURE: 67 MMHG | RESPIRATION RATE: 16 BRPM | SYSTOLIC BLOOD PRESSURE: 107 MMHG

## 2017-09-01 VITALS
SYSTOLIC BLOOD PRESSURE: 114 MMHG | DIASTOLIC BLOOD PRESSURE: 68 MMHG | HEART RATE: 79 BPM | OXYGEN SATURATION: 96 % | RESPIRATION RATE: 18 BRPM | TEMPERATURE: 98 F

## 2017-09-01 VITALS
HEART RATE: 72 BPM | RESPIRATION RATE: 17 BRPM | SYSTOLIC BLOOD PRESSURE: 98 MMHG | DIASTOLIC BLOOD PRESSURE: 60 MMHG | OXYGEN SATURATION: 98 % | TEMPERATURE: 96.6 F

## 2017-09-01 VITALS
HEART RATE: 73 BPM | TEMPERATURE: 96.3 F | SYSTOLIC BLOOD PRESSURE: 112 MMHG | RESPIRATION RATE: 16 BRPM | DIASTOLIC BLOOD PRESSURE: 66 MMHG | OXYGEN SATURATION: 97 %

## 2017-09-01 VITALS — SYSTOLIC BLOOD PRESSURE: 170 MMHG | DIASTOLIC BLOOD PRESSURE: 96 MMHG

## 2017-09-01 LAB
ANION GAP SERPL CALC-SCNC: 11 MEQ/L (ref 5–15)
BASOPHILS # BLD AUTO: 0.1 TH/MM3 (ref 0–0.2)
BASOPHILS NFR BLD: 1 % (ref 0–2)
BUN SERPL-MCNC: 17 MG/DL (ref 7–18)
CHLORIDE SERPL-SCNC: 107 MEQ/L (ref 98–107)
EOSINOPHIL # BLD: 0.2 TH/MM3 (ref 0–0.4)
EOSINOPHIL NFR BLD: 1.8 % (ref 0–4)
ERYTHROCYTE [DISTWIDTH] IN BLOOD BY AUTOMATED COUNT: 15.3 % (ref 11.6–17.2)
GFR SERPLBLD BASED ON 1.73 SQ M-ARVRAT: 66 ML/MIN (ref 89–?)
HCO3 BLD-SCNC: 22.5 MEQ/L (ref 21–32)
HCT VFR BLD CALC: 38.7 % (ref 35–46)
HEMO FLAGS: (no result)
LYMPHOCYTES # BLD AUTO: 2.5 TH/MM3 (ref 1–4.8)
LYMPHOCYTES NFR BLD AUTO: 27.3 % (ref 9–44)
MAGNESIUM SERPL-MCNC: 1.8 MG/DL (ref 1.5–2.5)
MCH RBC QN AUTO: 33 PG (ref 27–34)
MCHC RBC AUTO-ENTMCNC: 33.5 % (ref 32–36)
MCV RBC AUTO: 98.3 FL (ref 80–100)
MONOCYTES NFR BLD: 12.3 % (ref 0–8)
NEUTROPHILS # BLD AUTO: 5.2 TH/MM3 (ref 1.8–7.7)
NEUTROPHILS NFR BLD AUTO: 57.6 % (ref 16–70)
PLATELET # BLD: 400 TH/MM3 (ref 150–450)
POTASSIUM SERPL-SCNC: 3.1 MEQ/L (ref 3.5–5.1)
RBC # BLD AUTO: 3.93 MIL/MM3 (ref 4–5.3)
SODIUM SERPL-SCNC: 140 MEQ/L (ref 136–145)
WBC # BLD AUTO: 9.1 TH/MM3 (ref 4–11)

## 2017-09-01 RX ADMIN — Medication SCH ML: at 20:50

## 2017-09-01 RX ADMIN — SUCRALFATE SCH GM: 1 TABLET ORAL at 13:49

## 2017-09-01 RX ADMIN — ONDANSETRON PRN MG: 2 INJECTION, SOLUTION INTRAMUSCULAR; INTRAVENOUS at 05:49

## 2017-09-01 RX ADMIN — ENOXAPARIN SODIUM SCH MG: 40 INJECTION SUBCUTANEOUS at 20:00

## 2017-09-01 RX ADMIN — CIPROFLOXACIN SCH MLS/HR: 2 INJECTION, SOLUTION INTRAVENOUS at 20:49

## 2017-09-01 RX ADMIN — PHENYTOIN SODIUM SCH MLS/HR: 50 INJECTION INTRAMUSCULAR; INTRAVENOUS at 11:29

## 2017-09-01 RX ADMIN — SUCRALFATE SCH GM: 1 TABLET ORAL at 20:49

## 2017-09-01 RX ADMIN — OXYBUTYNIN CHLORIDE SCH MG: 5 TABLET ORAL at 09:55

## 2017-09-01 RX ADMIN — DULOXETINE SCH MG: 60 CAPSULE, DELAYED RELEASE ORAL at 09:55

## 2017-09-01 RX ADMIN — SODIUM CHLORIDE SCH MLS/HR: 900 INJECTION, SOLUTION INTRAVENOUS at 10:52

## 2017-09-01 RX ADMIN — STANDARDIZED SENNA CONCENTRATE AND DOCUSATE SODIUM SCH TAB: 8.6; 5 TABLET, FILM COATED ORAL at 09:00

## 2017-09-01 RX ADMIN — SUCRALFATE SCH GM: 1 TABLET ORAL at 09:55

## 2017-09-01 RX ADMIN — SODIUM CHLORIDE SCH MLS/HR: 900 INJECTION, SOLUTION INTRAVENOUS at 17:37

## 2017-09-01 RX ADMIN — CIPROFLOXACIN SCH MLS/HR: 2 INJECTION, SOLUTION INTRAVENOUS at 09:55

## 2017-09-01 RX ADMIN — ONDANSETRON PRN MG: 2 INJECTION, SOLUTION INTRAMUSCULAR; INTRAVENOUS at 20:49

## 2017-09-01 RX ADMIN — TOPIRAMATE SCH MG: 25 TABLET, COATED ORAL at 20:49

## 2017-09-01 RX ADMIN — HYDROMORPHONE HYDROCHLORIDE PRN MG: 1 INJECTION, SOLUTION INTRAMUSCULAR; INTRAVENOUS; SUBCUTANEOUS at 20:50

## 2017-09-01 RX ADMIN — HYDROMORPHONE HYDROCHLORIDE PRN MG: 1 INJECTION, SOLUTION INTRAMUSCULAR; INTRAVENOUS; SUBCUTANEOUS at 15:54

## 2017-09-01 RX ADMIN — HYDROMORPHONE HYDROCHLORIDE PRN MG: 1 INJECTION, SOLUTION INTRAMUSCULAR; INTRAVENOUS; SUBCUTANEOUS at 02:14

## 2017-09-01 RX ADMIN — Medication SCH ML: at 09:55

## 2017-09-01 RX ADMIN — LEVOTHYROXINE SODIUM SCH MCG: 75 TABLET ORAL at 05:49

## 2017-09-01 RX ADMIN — HYDROMORPHONE HYDROCHLORIDE PRN MG: 1 INJECTION, SOLUTION INTRAMUSCULAR; INTRAVENOUS; SUBCUTANEOUS at 09:54

## 2017-09-01 RX ADMIN — STANDARDIZED SENNA CONCENTRATE AND DOCUSATE SODIUM SCH TAB: 8.6; 5 TABLET, FILM COATED ORAL at 20:49

## 2017-09-01 RX ADMIN — ACETAMINOPHEN PRN MG: 325 TABLET ORAL at 23:24

## 2017-09-01 RX ADMIN — PANTOPRAZOLE SCH MG: 40 TABLET, DELAYED RELEASE ORAL at 09:54

## 2017-09-01 RX ADMIN — SODIUM CHLORIDE SCH MLS/HR: 900 INJECTION, SOLUTION INTRAVENOUS at 02:14

## 2017-09-01 RX ADMIN — PHENYTOIN SODIUM SCH MLS/HR: 50 INJECTION INTRAMUSCULAR; INTRAVENOUS at 20:49

## 2017-09-01 RX ADMIN — SUCRALFATE SCH GM: 1 TABLET ORAL at 17:37

## 2017-09-01 NOTE — HHI.PR
Subjective


Remarks


Patient seen and examined today in follow-up for abdominal pain.  Patient is 

planned for Gastrografin enema this morning.  It has been performed and does 

show complete clearance of her colon.  Patient is lying in bed.  States that 

she is not that hungry.  Will advance diet and see how patient tolerates.





Objective


Vitals





Vital Signs








  Date Time  Temp Pulse Resp B/P (MAP) Pulse Ox O2 Delivery O2 Flow Rate FiO2


 


9/1/17 12:00 96.6 75 16 199/101 (133) 95   


 


9/1/17 08:00 96.6 72 17 98/60 (73) 98   


 


9/1/17 00:00 98.4 75 16 107/67 (80) 95   


 


8/31/17 20:00 98.1 70 16 109/65 (80) 94   


 


8/31/17 16:00 97.2 70 18 170/102 (124) 97   


 


8/31/17 12:58   18     














I/O      


 


 8/31/17 8/31/17 8/31/17 9/1/17 9/1/17 9/1/17





 06:59 14:59 22:59 06:59 14:59 22:59


 


Intake Total 1220 ml 350 ml 500 ml 1400 ml  


 


Output Total  975 ml   1 ml 


 


Balance 1220 ml -625 ml 500 ml 1400 ml -1 ml 


 


      


 


Intake Oral 120 ml 350 ml  400 ml  


 


IV Total 1100 ml  500 ml 1000 ml  


 


Output Urine Total  975 ml   1 ml 


 


# Voids 0   2  


 


# Bowel Movements 0 0  0 3 








Result Diagram:  


9/1/17 0610                                                                    

            9/1/17 0610





Objective Remarks


GENERAL: Well-developed, cachectic, in no acute distress. alert and orientated


HEENT: Head is normocephalic without any lesions or masses noted. Facial 

features are symmetric. Eyes: Extraocular muscles are intact. Conjunctivae were 

clear.


NECK: Supple without any masses. Trachea midline no deviation. No JVD,


CARDIAC: Regular rhythm, regular rate. S1/S2 are heard.  2/6 ejection murmur, 

no gallops or rubs.


LUNGS: Clear to auscultation bilaterally. No wheeze, rhonchi or rales. No use 

of accessory muscles on inspiration or expiration.


ABDOMEN: Soft, nontender. Nondistended. Bowel sounds heard in all 4 quadrants. 

No organomegaly or masses. Negative rebound, negative guarding


EXTREMITIES: No edema, pulses are equal bilaterally. No cyanosis or clubbing


NEUROLOGY: Mood and affect appear appropriate. Cranial nerves II through XII 

grossly intact.  Moving all extremities, speech is clear


Urinary Catheter:  No


Vascular Central Line Catheter:  No





A/P


Assessment and Plan


Abdominal pain, persistent


   Multiple CT scan of the abdomen shows loops of prominent small bowel, with, 

shaped loop of bowel with maximum width being approximately 8 cm of the left 

side and right side gradually tapering to a narrow area.  Suggesting volvulus


   Repeat CT with contrast shows distended loop bowel in the upper abdomen 

represents transverse colon but no volvulus.


   Flat and upright abdominal x-ray shows distention of the ascending colon and 

proximal, transverse colon.  There are air-fluid levels in the upright view.  

Nondilated small bowel.  The cecum and ascending colon are prominent in size.


   Gastroenterologist was consulted and was recommending the abdominal x-ray 

and possible endoscopy.  However with the new findings on x-ray it was 

recommended patient undergo a Gastrografin enema for diagnostic/therapeutic 

reasons


   Gastrografin enema was performed which indicated successful Gastrografin 

enema without colonic obstruction or stricture





Leukocytosis


   Significantly improved


   Monitor CBC





Electrolyte abnormality with hyponatremia, hypokalemia


   Continue monitor and replete as needed





Hyperglycemia


   Hemoglobin A1c 5.7





Anxiety/Depression


   Continue home medications to include clonazepam 1 mg by mouth daily at 

bedtime, duloxetine 60 mg daily.





Hypothyroidism


   Continue home medication levothyroxine 75 mg daily.





DVT prevention


   Lovenox.





Full code


Discharge Planning


Discharge planning was tolerating by mouth, pain controlled and cleared by 

gastroenterologist











Brando Stauffer Sep 1, 2017 12:59

## 2017-09-01 NOTE — RADRPT
EXAM DATE/TIME:  09/01/2017 08:00 

 

[facility] COMPARISON:     

No previous studies available for comparison.

 

                     

INDICATIONS :     

Obstruction

                     

 

FLUORO TIME:     

1.7 minutes

 

IMAGE COUNT:     

19

                     

 

CONTRAST:      

1. [facility]

                     

 

MEDICAL HISTORY :     

Reflux, fibromyalgia   

 

SURGICAL HISTORY :     

Hysterectomy. Gastric bypass.  

 

ENCOUNTER:     

Initial                                        

 

ACUITY:     

4 - 6 days      

 

PAIN SCORE:     

5/10

 

LOCATION:     

Abdomen  

 

FINDINGS:     

A Gastrografin enema was performed.   film demonstrates some residual contrast within the right 
colon and transverse colon.  Abdominal wall mesh repair is noted.  Degenerative changes and scoliosis
 of the lumbar spine are noted.  No free intraperitoneal air is noted. 

 

Gastrografin flows freely through the colon without obstruction or stricture.  The cecum is identifie
d by filing of the terminal ileum and appendix.  Evaluation for mucosal lesions is not possible on th
is limited examination.  Post evacuation film demonstrates decompression of the colon with some resid
ual contrast noted.  

 

CONCLUSION:     

1.   No colonic obstruction or stricture noted.  

2.  Degenerative changes and scoliosis of the lumbar spine.  

 

 Ap Maier MD on September 01, 2017 at 10:42                

Board Certified Radiologist.

 This report was verified electronically.

## 2017-09-02 VITALS
SYSTOLIC BLOOD PRESSURE: 97 MMHG | TEMPERATURE: 96.8 F | DIASTOLIC BLOOD PRESSURE: 56 MMHG | HEART RATE: 72 BPM | OXYGEN SATURATION: 96 % | RESPIRATION RATE: 18 BRPM

## 2017-09-02 VITALS
TEMPERATURE: 97.7 F | HEART RATE: 73 BPM | DIASTOLIC BLOOD PRESSURE: 66 MMHG | OXYGEN SATURATION: 97 % | SYSTOLIC BLOOD PRESSURE: 105 MMHG | RESPIRATION RATE: 18 BRPM

## 2017-09-02 VITALS
TEMPERATURE: 97.7 F | SYSTOLIC BLOOD PRESSURE: 103 MMHG | RESPIRATION RATE: 16 BRPM | HEART RATE: 67 BPM | DIASTOLIC BLOOD PRESSURE: 62 MMHG | OXYGEN SATURATION: 96 %

## 2017-09-02 RX ADMIN — HYDROMORPHONE HYDROCHLORIDE PRN MG: 1 INJECTION, SOLUTION INTRAMUSCULAR; INTRAVENOUS; SUBCUTANEOUS at 02:20

## 2017-09-02 RX ADMIN — DULOXETINE SCH MG: 60 CAPSULE, DELAYED RELEASE ORAL at 09:35

## 2017-09-02 RX ADMIN — CIPROFLOXACIN SCH MLS/HR: 2 INJECTION, SOLUTION INTRAVENOUS at 09:38

## 2017-09-02 RX ADMIN — HYDROMORPHONE HYDROCHLORIDE PRN MG: 1 INJECTION, SOLUTION INTRAMUSCULAR; INTRAVENOUS; SUBCUTANEOUS at 05:24

## 2017-09-02 RX ADMIN — PANTOPRAZOLE SCH MG: 40 TABLET, DELAYED RELEASE ORAL at 09:35

## 2017-09-02 RX ADMIN — SUCRALFATE SCH GM: 1 TABLET ORAL at 13:44

## 2017-09-02 RX ADMIN — STANDARDIZED SENNA CONCENTRATE AND DOCUSATE SODIUM SCH TAB: 8.6; 5 TABLET, FILM COATED ORAL at 09:35

## 2017-09-02 RX ADMIN — HYDROMORPHONE HYDROCHLORIDE PRN MG: 1 INJECTION, SOLUTION INTRAMUSCULAR; INTRAVENOUS; SUBCUTANEOUS at 09:46

## 2017-09-02 RX ADMIN — SUCRALFATE SCH GM: 1 TABLET ORAL at 09:35

## 2017-09-02 RX ADMIN — Medication SCH ML: at 09:35

## 2017-09-02 RX ADMIN — SODIUM CHLORIDE SCH MLS/HR: 900 INJECTION, SOLUTION INTRAVENOUS at 09:41

## 2017-09-02 RX ADMIN — OXYBUTYNIN CHLORIDE SCH MG: 5 TABLET ORAL at 09:35

## 2017-09-02 RX ADMIN — LEVOTHYROXINE SODIUM SCH MCG: 75 TABLET ORAL at 05:20

## 2017-09-02 RX ADMIN — SODIUM CHLORIDE SCH MLS/HR: 900 INJECTION, SOLUTION INTRAVENOUS at 02:18

## 2017-09-02 RX ADMIN — PHENYTOIN SODIUM SCH MLS/HR: 50 INJECTION INTRAMUSCULAR; INTRAVENOUS at 07:29

## 2017-09-02 NOTE — HHI.DCPOC
Discharge Care Plan


Diagnosis:  


(1) Abdominal pain


Goals to Promote Your Health


* To prevent worsening of your condition and complications


* To maintain your health at the optimal level


Directions to Meet Your Goals


*** Take your medications as prescribed


*** Follow your dietary instruction


*** Follow activity as directed








*** Keep your appointments as scheduled


*** Take your immunizations and boosters as scheduled


*** If your symptoms worsen call your PCP, if no PCP go to Urgent Care Center 

or Emergency Room***


*** Smoking is Dangerous to Your Health. Avoid second hand smoke***


***Call the 24-hour hour crisis hotline for domestic abuse at 1-809.669.7672***











Brando Stauffer Sep 2, 2017 09:04

## 2017-09-02 NOTE — HHI.DS
__________________________________________________





Discharge Summary


Admission Date


Aug 29, 2017 at 13:12


Discharge Date:  Sep 2, 2017


Admitting Diagnosis





intractable abdominal pain





(1) Abdominal pain


ICD Code:  R10.9 - Unspecified abdominal pain


Status:  Acute


(2) History of gastric bypass


ICD Code:  Z98.890 - Other specified postprocedural states


(3) Anxiety and depression


ICD Code:  F41.8 - Other specified anxiety disorders


Procedures


none


Brief History - From Admission


Ms. Painting is a 67-year-old  female with a history of gastric bypass 

and reversal who presents to the emergency department today due to progressive 

epigastric abdominal pain, nausea and vomiting that started yesterday 

afternoon.  She cannot qualify if her pain is sharp or dull but states her pain 

is aggravating.  She could not sleep all night due to persistent pain.  She 

also had nausea and vomiting with bilious vomitus.  No blood in the vomitus.  

Patient also had 3 bowel movements yesterday and no blood in the stool.  No 

changes in bladder habits.  Initial CT abdomen pelvis indicated possibility of 

volvulus.  However, a repeat abdomen and pelvis with oral contrast showed no 

evidence of volvulus.  Patient was not transferred to the main hospital after 

discussing with general surgery.


CBC/BMP:  


9/1/17 0610                                                                    

            9/1/17 0610





Significant Findings





Laboratory Tests








Test


  8/31/17


14:55 9/1/17


06:10


 


Random Glucose


  121 MG/DL


() 


 


 


Potassium Level


  3.4 MEQ/L


(3.5-5.1) 3.1 MEQ/L


(3.5-5.1)


 


Red Blood Count


  


  3.93 MIL/MM3


(4.00-5.30)


 


Monocytes (%) (Auto)


  


  12.3 %


(0.0-8.0)


 


Monocytes # (Auto)


  


  1.1 TH/MM3


(0-0.9)


 


Calcium Level


  


  8.0 MG/DL


(8.5-10.1)


 


Estimat Glomerular Filtration


Rate 


  66 ML/MIN


(>89)








Imaging





Last Impressions








Enema w/Water Soluble 9/1/17 0000 Signed





Impressions: 





 Service Date/Time:  Friday, September 1, 2017 08:00 - CONCLUSION:  1.   No 





 colonic obstruction or stricture noted.   2.  Degenerative changes and 

scoliosis 





 of the lumbar spine.     Ap Maier MD 


 


Abdomen X-Ray 8/31/17 0000 Signed





Impressions: 





 Service Date/Time:  Thursday, August 31, 2017 12:34 - CONCLUSION:  Mild 

gaseous 





 distention of the ascending colon but this is less distended than on the prior 





 study. Otherwise, unremarkable exam.     Blanco Alvarado Jr., MD 


 


Abdomen/Pelvis CT 8/29/17 0000 Signed





Impressions: 





 Service Date/Time:  Tuesday, August 29, 2017 14:13 - CONCLUSION: Distended 

loop 





 of bowel in the upper abdomen represents transverse colon indeterminate 

etiology 





 but not sigmoid volvulus. Patient would benefit from GI consult and possibly 





 barium enema for further evaluation     Alex Myers MD 








PE at Discharge


GENERAL: Well-developed, cachectic, in no acute distress. alert and orientated


HEENT: Head is normocephalic without any lesions or masses noted. Facial 

features are symmetric. Eyes: Extraocular muscles are intact. Conjunctivae were 

clear.


NECK: Supple without any masses. Trachea midline no deviation. No JVD,


CARDIAC: Regular rhythm, regular rate. S1/S2 are heard.  2/6 ejection murmur, 

no gallops or rubs.


LUNGS: Clear to auscultation bilaterally. No wheeze, rhonchi or rales. No use 

of accessory muscles on inspiration or expiration.


ABDOMEN: Soft, nontender. Nondistended. Bowel sounds heard in all 4 quadrants. 

No organomegaly or masses. Negative rebound, negative guarding


EXTREMITIES: No edema, pulses are equal bilaterally. No cyanosis or clubbing


NEUROLOGY: Mood and affect appear appropriate. Cranial nerves II through XII 

grossly intact.  Moving all extremities, speech is clear


Pt update on day of discharge


Patient with good bowel movements, abdominal pain is resolved and patient has 

tolerated diet.


Hospital Course


This patient is a 67-year-old female who had intractable abdominal pain which 

has been evaluated here in the hospital.  She had inability to eat severely 

nauseated with severely slight imbalances.  She had severe distention and was 

thought to Possibly have partial small bowel obstruction versus gastroenteritis 

versus volvulus.  Symptoms seem to improve with conservative management and IV 

hydration as well as antibiotics.  Patient was seen by gastroenterology 

consultation.


Pt Condition on Discharge:  Good


Discharge Disposition:  Discharge Home


Discharge Time:  > 30 minutes


Discharge Instructions


DIET: Follow Instructions for:  As Tolerated, No Restrictions


Activities you can perform:  Regular-No Restrictions


Follow up Referrals:  


Gastroenterology - 2 Weeks


PCP Follow-up - 1 Week





New Medications:  


Acetaminophen (Tylenol) 325 Mg Tab


650 MG PO Q6H PRN for pain, #90 TAB 0 Refills





 


Continued Medications:  


Clonazepam (Clonazepam) 1 Mg Tab


1 MG PO HS, #60 TAB 0 Refills





Duloxetine DR (Duloxetine DR) 60 Mg Capdr


60 MG PO DAILY, #30 CAP 0 Refills





Gabapentin (Neurontin) 300 Mg Cap


900 MG PO TID, #90 CAP 0 Refills





Levothyroxine (Levothyroxine) 75 Mcg Tab


75 MCG PO DAILY for Thyroid, #30 TAB 0 Refills





Omeprazole (Omeprazole) 40 Mg Cap


80 MG PO DAILY, #30 CAP 0 Refills





Oxybutynin (Ditropan) 5 Mg Tab


5 MG PO DAILY for Urinary Symptom Managemen, #60 TAB 0 Refills





Paroxetine (Paroxetine) 40 Mg Tab


40 MG PO DAILY, #30 TAB 0 Refills





Paroxetine (Paroxetine) 10 Mg Tab


10 MG PO DAILY, #30 TAB 0 Refills





Sucralfate (Carafate) 1 Gm Tab


1 GM PO QID for Ulcer Prevention, #120 TAB 0 Refills


On empty stomach


Topiramate (Topiramate) 50 Mg Tab


75 MG PO HS for Control Seizures, #60 TAB 0 Refills

















Chrystal Galaviz MD Sep 2, 2017 11:11

## 2018-01-03 ENCOUNTER — HOSPITAL ENCOUNTER (EMERGENCY)
Dept: HOSPITAL 17 - PHEFT | Age: 68
Discharge: HOME | End: 2018-01-03
Payer: MEDICARE

## 2018-01-03 VITALS
OXYGEN SATURATION: 99 % | HEART RATE: 83 BPM | RESPIRATION RATE: 16 BRPM | TEMPERATURE: 99 F | DIASTOLIC BLOOD PRESSURE: 70 MMHG | SYSTOLIC BLOOD PRESSURE: 114 MMHG

## 2018-01-03 VITALS — HEIGHT: 62 IN | BODY MASS INDEX: 21.7 KG/M2 | WEIGHT: 117.95 LBS

## 2018-01-03 DIAGNOSIS — M19.90: ICD-10-CM

## 2018-01-03 DIAGNOSIS — K21.9: ICD-10-CM

## 2018-01-03 DIAGNOSIS — M79.7: ICD-10-CM

## 2018-01-03 DIAGNOSIS — F41.9: ICD-10-CM

## 2018-01-03 DIAGNOSIS — Z87.19: ICD-10-CM

## 2018-01-03 DIAGNOSIS — I48.91: ICD-10-CM

## 2018-01-03 DIAGNOSIS — F17.200: ICD-10-CM

## 2018-01-03 DIAGNOSIS — R56.9: ICD-10-CM

## 2018-01-03 DIAGNOSIS — B37.0: Primary | ICD-10-CM

## 2018-01-03 PROCEDURE — 99283 EMERGENCY DEPT VISIT LOW MDM: CPT

## 2018-01-03 NOTE — PD
HPI


Chief Complaint:  ENT Complaint


Time Seen by Provider:  20:16


Travel History


International Travel<30 days:  No


Contact w/Intl Traveler<30days:  No


Traveled to known affect area:  No





History of Present Illness


HPI


67-year-old female here with complaint of thrush.  She reports she's had 

similar episodes in the past after taking antibiotics.  She recently finished a 

ten-day course of 2 different antibiotics for diverticulitis.  She reports she 

now has white patches and oral pain.  No fever or chills.  No difficulty eating 

or drinking.  Symptom severity is moderate.  No aggravating or alleviating 

factors.  She reports she saw her primary care doctor Dr. Vanegas today who 

diagnosed her with rash and was supposed to send in an order for Diflucan to 

her pharmacy.  She reports the pharmacy did not receive the order which is why 

she came here.





PFSH


Past Medical History


Arthritis:  Yes


Asthma:  No


Autoimmune Disease:  No


Anxiety:  Yes


Heart Rhythm Problems:  Yes (A FIB)


Cardiovascular Problems:  Yes (hx of a-fib)


Chest Pain:  No


Congestive Heart Failure:  No


COPD:  No


Cerebrovascular Accident:  No


Diminished Hearing:  Yes


Fibromyalgia:  Yes


Gastrointestinal Disorders:  Yes


GERD:  Yes


Genitourinary:  No


Headaches:  Yes (MIGRAINES)


Hiatal Hernia:  No


Immune Disorder:  No


Kidney Stones:  No


Musculoskeletal:  Yes


Neurologic:  Yes


Reproductive:  Yes


Respiratory:  No


Migraines:  Yes


Renal Failure:  No


Seizures:  Yes (one earlier this year)


Sleep Apnea:  No


Ulcer:  Yes


Tetanus Vaccination:  Unknown


Influenza Vaccination:  Yes


Pregnant?:  Not Pregnant





Past Surgical History


Abdominal Surgery:  Yes (bypass)


Cardiac Surgery:  No


Ear Surgery:  No


Endocrine Surgery:  No


Eye Surgery:  No


Genitourinary Surgery:  No


Gynecologic Surgery:  Yes (hysto)


Hysterectomy:  Yes


Joint Replacement:  Yes (right knee)


Oral Surgery:  No


Thoracic Surgery:  No


Other Surgery:  Yes (GASTRIC BYPASS AND REVERSAL)





Social History


Alcohol Use:  No


Tobacco Use:  Yes (1 ppd)


Substance Use:  No





Allergies-Medications


(Allergen,Severity, Reaction):  


Coded Allergies:  


     codeine (Unverified  Allergy, Intermediate, headache,n/v, 1/3/18)


     morphine (Unverified  Allergy, Intermediate, headache,n/v, 1/3/18)


     oxycodone (Unverified  Allergy, Intermediate, headaches,n/v, 1/3/18)


     zolpidem (Verified  Allergy, Mild, mood changes, 1/3/18)


Reported Meds & Prescriptions





Reported Meds & Active Scripts


Active


Diflucan (Fluconazole) 100 Mg Tab 100 Mg PO DAILY 7 Days


Proair Hfa 8.5 GM Inh (Albuterol Sulfate) 90 Mcg/Act Aer 2 Puff INH Q4-6H PRN


     108 mcg/actuation


Topiramate 50 Mg Tab 75 Mg PO HS


Duloxetine DR (Duloxetine HCl) 60 Mg Capdr 60 Mg PO DAILY


Clonazepam 1 Mg Tab 1 Mg PO HS


Carafate (Sucralfate) 1 Gm Tab 1 Gm PO QID


     On empty stomach


Levothyroxine (Levothyroxine Sodium) 75 Mcg Tab 75 Mcg PO DAILY


Paroxetine (Paroxetine HCl) 40 Mg Tab 40 Mg PO HS


     One tablet each night.


Paroxetine (Paroxetine HCl) 10 Mg Tab 10 Mg PO DAILY


     One tablet each morning


Fenofibrate 48 Mg Tab 48 Mg PO DAILY


Neurontin (Gabapentin) 300 Mg Cap 900 Mg PO TID


Estrace (Estradiol) 1 Mg Tab 1 Mg PO DAILY


     Take 1 tablet once a day for 2 weeks.


     Then take 1 tablet Tuesday/Thursday each week after.


Omeprazole 40 Mg Cap 40 Mg PO BID


Ditropan (Oxybutynin Chloride) 5 Mg Tab 5 Mg PO DAILY


Tylenol (Acetaminophen) 325 Mg Tab 650 Mg PO Q6H PRN








Review of Systems


Except as stated in HPI:  all other systems reviewed are Neg


General / Constitutional:  No: Fever


Eyes:  No: Visual changes


HENT:  No: Headaches


Cardiovascular:  No: Chest Pain or Discomfort


Respiratory:  No: Shortness of Breath





Physical Exam


Narrative


GENERAL: Alert female.  Well-appearing.


SKIN: Warm and dry.


HEAD: Normocephalic.


EYES:  No injection or drainage. 


ENT: White patches scattered across the oral mucosa.  The uvula is midline.  

Airway is patent.


NECK: Supple, trachea midline. No lymphadenopathy.


CARDIOVASCULAR: Regular rate and rhythm 


RESPIRATORY: Breath sounds equal bilaterally. No accessory muscle use.


GASTROINTESTINAL: Abdomen soft, non-tender, nondistended. 











Data


Data


Last Documented VS





Vital Signs








  Date Time  Temp Pulse Resp B/P (MAP) Pulse Ox O2 Delivery O2 Flow Rate FiO2


 


1/3/18 20:03 99.0 83 16 114/70 (85) 99   











MDM


Medical Decision Making


Medical Screen Exam Complete:  Yes


Emergency Medical Condition:  Yes


Differential Diagnosis


Thrush, aphthous ulcer, other


Narrative Course


67 -year-old female here with oral thrush after completing a round of 2 

antibiotics for diverticulitis.  She has had this in the past.  She is well-

appearing.  Her vital signs are stable.





Diagnosis





 Primary Impression:  


 Thrush, oral


Referrals:  


Trace Vanegas MD R2





***Additional Instructions:  


Diflucan as prescribed.


Follow-up with Dr. Vanegas.


Scripts


Fluconazole (Diflucan) 100 Mg Tab


100 MG PO DAILY for Infection for 7 Days, #7 TAB 0 Refills


   Prov: Catherine Bethea         1/3/18


Disposition:  01 DISCHARGE HOME


Condition:  Stable











Catherine Bethea Gabriel 3, 2018 20:36

## 2018-01-22 ENCOUNTER — HOSPITAL ENCOUNTER (EMERGENCY)
Dept: HOSPITAL 17 - PHED | Age: 68
End: 2018-01-22
Payer: MEDICARE

## 2018-01-22 DIAGNOSIS — Z79.899: ICD-10-CM

## 2018-01-22 DIAGNOSIS — I46.9: Primary | ICD-10-CM

## 2018-01-22 DIAGNOSIS — F41.9: ICD-10-CM

## 2018-01-22 DIAGNOSIS — M79.7: ICD-10-CM

## 2018-01-22 DIAGNOSIS — I48.91: ICD-10-CM

## 2018-01-22 DIAGNOSIS — K21.9: ICD-10-CM

## 2018-01-22 DIAGNOSIS — Z88.5: ICD-10-CM

## 2018-01-22 DIAGNOSIS — F17.210: ICD-10-CM

## 2018-01-22 PROCEDURE — 99285 EMERGENCY DEPT VISIT HI MDM: CPT

## 2018-01-22 PROCEDURE — 92950 HEART/LUNG RESUSCITATION CPR: CPT

## 2018-02-27 NOTE — HHI.GIFU
1008 Pt arrives ambulatory for IVIG infusion. Infusion explained to pt and questions answered. PT RIGHTS AND RESPONSIBILITIES OFFERED TO PT. Pt states she took her tylenol and benedryl at home. Pt provided with diet apolonia mist.  1100 pt sleeping. 1225 infusion complete. Pt tolerated it well with no complaints. Vitals stable. Pt discharged ambulatory with instructions with no complaints.              _m___ Safety:       (Environmental)   [place] to environment   Ensure ID band is correct and in place/ allergy band as needed   Assess for fall risk   Initiate fall precautions as applicable (fall band, side rails, etc.)   Call light within reach   Bed in low position/ wheels locked    _m___ Pain:        Assess pain level and characteristics   Administer analgesics as ordered   Assess effectiveness of pain management and report to MD as needed    _m___ Knowledge Deficit:   Assess baseline knowledge   Provide teaching at level of understanding   Provide teaching via preferred learning method   Evaluate teaching effectiveness    _m___ Hemodynamic/Respiratory Status:       (Pre and Post Procedure Monitoring)   Assess/Monitor vital signs and LOC   Assess Baseline SpO2 prior to any sedation   Obtain weight/height   Assess vital signs/ LOC until patient meets discharge criteria   Monitor procedure site and notify MD of any issues    _m___ Infection-Risk of Central Venous Catheter:   Monitor for infection signs and symptoms (catheter site redness, temperature elevation, etc)   Assess for infection risks   Educate regarding infection prevention   Manage central venous catheter (flushes/ dressing changes per protocol) Subjective


Remarks


Patient was seen and examined today, was sleeping comfortably, patient was seen 

and examined today, she was laying in bed comfortably, she had Gastrografin 

enema yesterday, no sign of obstruction in the colon or volvulus. Feels better 

today than yesterday





Objective


Vitals I&O





Vital Signs








  Date Time  Temp Pulse Resp B/P (MAP) Pulse Ox O2 Delivery O2 Flow Rate FiO2


 


9/1/17 13:22    170/96 (120)    


 


9/1/17 12:00 96.6 75 16 199/101 (133) 95   


 


9/1/17 08:00 96.6 72 17 98/60 (73) 98   


 


9/1/17 00:00 98.4 75 16 107/67 (80) 95   


 


8/31/17 20:00 98.1 70 16 109/65 (80) 94   














I/O      


 


 8/31/17 8/31/17 8/31/17 9/1/17 9/1/17 9/1/17





 06:59 14:59 22:59 06:59 14:59 22:59


 


Intake Total 1220 ml 350 ml 500 ml 1400 ml 90 ml 


 


Output Total  975 ml   1 ml 


 


Balance 1220 ml -625 ml 500 ml 1400 ml 89 ml 


 


      


 


Intake Oral 120 ml 350 ml  400 ml 90 ml 


 


IV Total 1100 ml  500 ml 1000 ml  


 


Output Urine Total  975 ml   1 ml 


 


# Voids 0   2  


 


# Bowel Movements 0 0  0 3 








Laboratory





Laboratory Tests








Test


  9/1/17


06:10


 


White Blood Count 9.1 


 


Red Blood Count 3.93 


 


Hemoglobin 13.0 


 


Hematocrit 38.7 


 


Mean Corpuscular Volume 98.3 


 


Mean Corpuscular Hemoglobin 33.0 


 


Mean Corpuscular Hemoglobin


Concent 33.5 


 


 


Red Cell Distribution Width 15.3 


 


Platelet Count 400 


 


Mean Platelet Volume 8.0 


 


Neutrophils (%) (Auto) 57.6 


 


Lymphocytes (%) (Auto) 27.3 


 


Monocytes (%) (Auto) 12.3 


 


Eosinophils (%) (Auto) 1.8 


 


Basophils (%) (Auto) 1.0 


 


Neutrophils # (Auto) 5.2 


 


Lymphocytes # (Auto) 2.5 


 


Monocytes # (Auto) 1.1 


 


Eosinophils # (Auto) 0.2 


 


Basophils # (Auto) 0.1 


 


CBC Comment DIFF FINAL 


 


Differential Comment  


 


Blood Urea Nitrogen 17 


 


Creatinine 0.85 


 


Random Glucose 105 


 


Calcium Level 8.0 


 


Magnesium Level 1.8 


 


Sodium Level 140 


 


Potassium Level 3.1 


 


Chloride Level 107 


 


Carbon Dioxide Level 22.5 


 


Anion Gap 11 


 


Estimat Glomerular Filtration


Rate 66 


 














 Date/Time


Source Procedure


Growth Status


 


 


 8/29/17 13:25


Blood Peripheral Aerobic Blood Culture - Preliminary


NO GROWTH IN 3 DAYS Resulted


 


 8/29/17 13:25


Blood Peripheral Anaerobic Blood Culture - Preliminary


NO GROWTH IN 3 DAYS Resulted








Physical Exam


HEENT: Pupils round and reactive to light; normocephalic; atraumatic; no 

jaundice.  Throat is clear.


NECK: Neck is supple, no JVD, no lymphadenopathy.


CHEST:  Chest is clear to auscultation and percussion.


CARDIAC:  Regular rate and rhythm with no murmur gallop or rubs.


ABDOMEN:  Soft, Mildly distended, Minimal epigastric tenderness And throughout 

the abdomen; no hepatosplenomegaly; bowel sounds are present in all four 

quadrants.


EXTREMITIES: No clubbing, cyanosis, or edema.


SKIN:  Normal; no rash; no jaundice.


CNS:  No focal deficits; alert and oriented times three.





Assessment and Plan


Plan


Patient is here because of abdominal pain which was sudden nausea vomiting, 

patient has multiple surgical procedures in the past including gastric bypass 

and revision of the bypass


Possibly partial small bowel obstruction versus gastroenteritis \, Patient 

getting worse today with more discomfort and nausea and slightly more distended 

abdomen





91 2017 Patient feeling better today, still mild abdominal discomfort, x-ray 

looked okay no colon obstruction





Recommendation





Clear liquid diet We'll advance it as tolerated


Possible KUB in the morning











Seymour Humphrey MD Sep 1, 2017 17:04